# Patient Record
Sex: MALE | Race: WHITE | Employment: OTHER | URBAN - METROPOLITAN AREA
[De-identification: names, ages, dates, MRNs, and addresses within clinical notes are randomized per-mention and may not be internally consistent; named-entity substitution may affect disease eponyms.]

---

## 2021-01-13 ENCOUNTER — HOSPITAL ENCOUNTER (INPATIENT)
Age: 61
LOS: 8 days | Discharge: HOME | DRG: 246 | End: 2021-01-21
Attending: EMERGENCY MEDICINE | Admitting: INTERNAL MEDICINE
Payer: COMMERCIAL

## 2021-01-13 DIAGNOSIS — I21.3 ST ELEVATION MYOCARDIAL INFARCTION (STEMI), UNSPECIFIED ARTERY (HCC): Primary | ICD-10-CM

## 2021-01-13 PROBLEM — I21.02 STEMI INVOLVING LEFT ANTERIOR DESCENDING CORONARY ARTERY (HCC): Status: ACTIVE | Noted: 2021-01-13

## 2021-01-13 LAB
A/G RATIO: 1.3 (ref 1.1–2.2)
ABO/RH: NORMAL
ALBUMIN SERPL-MCNC: 3.9 G/DL (ref 3.4–5)
ALP BLD-CCNC: 69 U/L (ref 40–129)
ALT SERPL-CCNC: 61 U/L (ref 10–40)
ANION GAP SERPL CALCULATED.3IONS-SCNC: 10 MMOL/L (ref 3–16)
ANTIBODY SCREEN: NORMAL
APTT: 45.2 SEC (ref 24.2–36.2)
APTT: >248 SEC (ref 24.2–36.2)
AST SERPL-CCNC: 45 U/L (ref 15–37)
BASE EXCESS ARTERIAL: -4 MMOL/L (ref -3–3)
BASOPHILS ABSOLUTE: 0 K/UL (ref 0–0.2)
BASOPHILS RELATIVE PERCENT: 0.6 %
BILIRUB SERPL-MCNC: 0.5 MG/DL (ref 0–1)
BILIRUBIN URINE: NEGATIVE
BLOOD, URINE: NEGATIVE
BUN BLDV-MCNC: 24 MG/DL (ref 7–20)
CALCIUM SERPL-MCNC: 8.6 MG/DL (ref 8.3–10.6)
CARBOXYHEMOGLOBIN ARTERIAL: 0.9 % (ref 0–1.5)
CHLORIDE BLD-SCNC: 99 MMOL/L (ref 99–110)
CLARITY: CLEAR
CO2: 22 MMOL/L (ref 21–32)
COLOR: YELLOW
CREAT SERPL-MCNC: 0.7 MG/DL (ref 0.8–1.3)
EOSINOPHILS ABSOLUTE: 0.2 K/UL (ref 0–0.6)
EOSINOPHILS RELATIVE PERCENT: 2.8 %
GFR AFRICAN AMERICAN: >60
GFR NON-AFRICAN AMERICAN: >60
GLOBULIN: 3 G/DL
GLUCOSE BLD-MCNC: 123 MG/DL (ref 70–99)
GLUCOSE URINE: 250 MG/DL
HCO3 ARTERIAL: 20.9 MMOL/L (ref 21–29)
HCT VFR BLD CALC: 42.7 % (ref 40.5–52.5)
HEMOGLOBIN, ART, EXTENDED: 14.4 G/DL (ref 13.5–17.5)
HEMOGLOBIN: 14.1 G/DL (ref 13.5–17.5)
INR BLD: 1.11 (ref 0.86–1.14)
KETONES, URINE: NEGATIVE MG/DL
LEFT VENTRICULAR EJECTION FRACTION HIGH VALUE: 30 %
LEFT VENTRICULAR EJECTION FRACTION MODE: NORMAL
LEUKOCYTE ESTERASE, URINE: NEGATIVE
LV EF: 25 %
LYMPHOCYTES ABSOLUTE: 2.3 K/UL (ref 1–5.1)
LYMPHOCYTES RELATIVE PERCENT: 28.9 %
MCH RBC QN AUTO: 30.5 PG (ref 26–34)
MCHC RBC AUTO-ENTMCNC: 33 G/DL (ref 31–36)
MCV RBC AUTO: 92.3 FL (ref 80–100)
METHEMOGLOBIN ARTERIAL: 0.2 %
MICROSCOPIC EXAMINATION: ABNORMAL
MONOCYTES ABSOLUTE: 0.7 K/UL (ref 0–1.3)
MONOCYTES RELATIVE PERCENT: 9.1 %
NEUTROPHILS ABSOLUTE: 4.6 K/UL (ref 1.7–7.7)
NEUTROPHILS RELATIVE PERCENT: 58.6 %
NITRITE, URINE: NEGATIVE
O2 CONTENT ARTERIAL: 20 ML/DL
O2 SAT, ARTERIAL: 99.4 %
O2 THERAPY: ABNORMAL
PCO2 ARTERIAL: 36.9 MMHG (ref 35–45)
PDW BLD-RTO: 13.4 % (ref 12.4–15.4)
PH ARTERIAL: 7.36 (ref 7.35–7.45)
PH UA: 7 (ref 5–8)
PLATELET # BLD: 150 K/UL (ref 135–450)
PMV BLD AUTO: 9.2 FL (ref 5–10.5)
PO2 ARTERIAL: 138 MMHG (ref 75–108)
POC ACT LR: 225 SEC
POC ACT LR: 300 SEC
POC ACT LR: 319 SEC
POTASSIUM SERPL-SCNC: 3.3 MMOL/L (ref 3.5–5.1)
PROTEIN UA: NEGATIVE MG/DL
PROTHROMBIN TIME: 12.9 SEC (ref 10–13.2)
RBC # BLD: 4.63 M/UL (ref 4.2–5.9)
SARS-COV-2, NAAT: DETECTED
SODIUM BLD-SCNC: 131 MMOL/L (ref 136–145)
SPECIFIC GRAVITY UA: >1.03 (ref 1–1.03)
TCO2 ARTERIAL: 49.4 MMOL/L
TOTAL PROTEIN: 6.9 G/DL (ref 6.4–8.2)
URINE REFLEX TO CULTURE: ABNORMAL
URINE TYPE: ABNORMAL
UROBILINOGEN, URINE: 0.2 E.U./DL
WBC # BLD: 7.8 K/UL (ref 4–11)

## 2021-01-13 PROCEDURE — 80053 COMPREHEN METABOLIC PANEL: CPT

## 2021-01-13 PROCEDURE — 80061 LIPID PANEL: CPT

## 2021-01-13 PROCEDURE — 85610 PROTHROMBIN TIME: CPT

## 2021-01-13 PROCEDURE — 86901 BLOOD TYPING SEROLOGIC RH(D): CPT

## 2021-01-13 PROCEDURE — 85730 THROMBOPLASTIN TIME PARTIAL: CPT

## 2021-01-13 PROCEDURE — 99291 CRITICAL CARE FIRST HOUR: CPT | Performed by: INTERNAL MEDICINE

## 2021-01-13 PROCEDURE — 93458 L HRT ARTERY/VENTRICLE ANGIO: CPT | Performed by: INTERNAL MEDICINE

## 2021-01-13 PROCEDURE — C9460 INJECTION, CANGRELOR: HCPCS | Performed by: INTERNAL MEDICINE

## 2021-01-13 PROCEDURE — 99153 MOD SED SAME PHYS/QHP EA: CPT

## 2021-01-13 PROCEDURE — 6370000000 HC RX 637 (ALT 250 FOR IP): Performed by: INTERNAL MEDICINE

## 2021-01-13 PROCEDURE — 2580000003 HC RX 258: Performed by: INTERNAL MEDICINE

## 2021-01-13 PROCEDURE — 86900 BLOOD TYPING SEROLOGIC ABO: CPT

## 2021-01-13 PROCEDURE — 84443 ASSAY THYROID STIM HORMONE: CPT

## 2021-01-13 PROCEDURE — 6370000000 HC RX 637 (ALT 250 FOR IP)

## 2021-01-13 PROCEDURE — 2709999900 HC NON-CHARGEABLE SUPPLY

## 2021-01-13 PROCEDURE — 2500000003 HC RX 250 WO HCPCS: Performed by: INTERNAL MEDICINE

## 2021-01-13 PROCEDURE — 4A023N7 MEASUREMENT OF CARDIAC SAMPLING AND PRESSURE, LEFT HEART, PERCUTANEOUS APPROACH: ICD-10-PCS | Performed by: INTERNAL MEDICINE

## 2021-01-13 PROCEDURE — 99152 MOD SED SAME PHYS/QHP 5/>YRS: CPT | Performed by: INTERNAL MEDICINE

## 2021-01-13 PROCEDURE — 82803 BLOOD GASES ANY COMBINATION: CPT

## 2021-01-13 PROCEDURE — B2151ZZ FLUOROSCOPY OF LEFT HEART USING LOW OSMOLAR CONTRAST: ICD-10-PCS | Performed by: INTERNAL MEDICINE

## 2021-01-13 PROCEDURE — 93458 L HRT ARTERY/VENTRICLE ANGIO: CPT

## 2021-01-13 PROCEDURE — 85347 COAGULATION TIME ACTIVATED: CPT

## 2021-01-13 PROCEDURE — 05HN33Z INSERTION OF INFUSION DEVICE INTO LEFT INTERNAL JUGULAR VEIN, PERCUTANEOUS APPROACH: ICD-10-PCS | Performed by: INTERNAL MEDICINE

## 2021-01-13 PROCEDURE — U0002 COVID-19 LAB TEST NON-CDC: HCPCS

## 2021-01-13 PROCEDURE — 2500000003 HC RX 250 WO HCPCS

## 2021-01-13 PROCEDURE — C1894 INTRO/SHEATH, NON-LASER: HCPCS

## 2021-01-13 PROCEDURE — 36415 COLL VENOUS BLD VENIPUNCTURE: CPT

## 2021-01-13 PROCEDURE — 2580000003 HC RX 258

## 2021-01-13 PROCEDURE — 92941 PRQ TRLML REVSC TOT OCCL AMI: CPT | Performed by: INTERNAL MEDICINE

## 2021-01-13 PROCEDURE — C1887 CATHETER, GUIDING: HCPCS

## 2021-01-13 PROCEDURE — 92941 PRQ TRLML REVSC TOT OCCL AMI: CPT

## 2021-01-13 PROCEDURE — C1769 GUIDE WIRE: HCPCS

## 2021-01-13 PROCEDURE — C9460 INJECTION, CANGRELOR: HCPCS

## 2021-01-13 PROCEDURE — 99152 MOD SED SAME PHYS/QHP 5/>YRS: CPT

## 2021-01-13 PROCEDURE — 6360000004 HC RX CONTRAST MEDICATION: Performed by: INTERNAL MEDICINE

## 2021-01-13 PROCEDURE — 85025 COMPLETE CBC W/AUTO DIFF WBC: CPT

## 2021-01-13 PROCEDURE — U0003 INFECTIOUS AGENT DETECTION BY NUCLEIC ACID (DNA OR RNA); SEVERE ACUTE RESPIRATORY SYNDROME CORONAVIRUS 2 (SARS-COV-2) (CORONAVIRUS DISEASE [COVID-19]), AMPLIFIED PROBE TECHNIQUE, MAKING USE OF HIGH THROUGHPUT TECHNOLOGIES AS DESCRIBED BY CMS-2020-01-R: HCPCS

## 2021-01-13 PROCEDURE — 02713ZZ DILATION OF CORONARY ARTERY, TWO ARTERIES, PERCUTANEOUS APPROACH: ICD-10-PCS | Performed by: INTERNAL MEDICINE

## 2021-01-13 PROCEDURE — 6360000002 HC RX W HCPCS: Performed by: INTERNAL MEDICINE

## 2021-01-13 PROCEDURE — 2000000000 HC ICU R&B

## 2021-01-13 PROCEDURE — C1725 CATH, TRANSLUMIN NON-LASER: HCPCS

## 2021-01-13 PROCEDURE — 99282 EMERGENCY DEPT VISIT SF MDM: CPT

## 2021-01-13 PROCEDURE — B2111ZZ FLUOROSCOPY OF MULTIPLE CORONARY ARTERIES USING LOW OSMOLAR CONTRAST: ICD-10-PCS | Performed by: INTERNAL MEDICINE

## 2021-01-13 PROCEDURE — 86850 RBC ANTIBODY SCREEN: CPT

## 2021-01-13 PROCEDURE — 81003 URINALYSIS AUTO W/O SCOPE: CPT

## 2021-01-13 PROCEDURE — 83036 HEMOGLOBIN GLYCOSYLATED A1C: CPT

## 2021-01-13 PROCEDURE — 6360000002 HC RX W HCPCS

## 2021-01-13 RX ORDER — SODIUM CHLORIDE 0.9 % (FLUSH) 0.9 %
10 SYRINGE (ML) INJECTION EVERY 12 HOURS SCHEDULED
Status: DISCONTINUED | OUTPATIENT
Start: 2021-01-13 | End: 2021-01-21 | Stop reason: HOSPADM

## 2021-01-13 RX ORDER — ASPIRIN 325 MG
325 TABLET ORAL DAILY
Status: DISCONTINUED | OUTPATIENT
Start: 2021-01-14 | End: 2021-01-18

## 2021-01-13 RX ORDER — POTASSIUM CHLORIDE 20 MEQ/1
40 TABLET, EXTENDED RELEASE ORAL PRN
Status: DISCONTINUED | OUTPATIENT
Start: 2021-01-13 | End: 2021-01-21 | Stop reason: HOSPADM

## 2021-01-13 RX ORDER — HEPARIN SODIUM 1000 [USP'U]/ML
4000 INJECTION, SOLUTION INTRAVENOUS; SUBCUTANEOUS PRN
Status: DISCONTINUED | OUTPATIENT
Start: 2021-01-13 | End: 2021-01-20

## 2021-01-13 RX ORDER — HEPARIN SODIUM 10000 [USP'U]/100ML
12 INJECTION, SOLUTION INTRAVENOUS CONTINUOUS
Status: DISCONTINUED | OUTPATIENT
Start: 2021-01-13 | End: 2021-01-20

## 2021-01-13 RX ORDER — HEPARIN SODIUM 1000 [USP'U]/ML
2000 INJECTION, SOLUTION INTRAVENOUS; SUBCUTANEOUS PRN
Status: DISCONTINUED | OUTPATIENT
Start: 2021-01-13 | End: 2021-01-20

## 2021-01-13 RX ORDER — POTASSIUM CHLORIDE 7.45 MG/ML
10 INJECTION INTRAVENOUS PRN
Status: DISCONTINUED | OUTPATIENT
Start: 2021-01-13 | End: 2021-01-21 | Stop reason: HOSPADM

## 2021-01-13 RX ORDER — SODIUM CHLORIDE 0.9 % (FLUSH) 0.9 %
10 SYRINGE (ML) INJECTION PRN
Status: DISCONTINUED | OUTPATIENT
Start: 2021-01-13 | End: 2021-01-21 | Stop reason: HOSPADM

## 2021-01-13 RX ORDER — ATORVASTATIN CALCIUM 80 MG/1
80 TABLET, FILM COATED ORAL NIGHTLY
Status: DISCONTINUED | OUTPATIENT
Start: 2021-01-13 | End: 2021-01-14

## 2021-01-13 RX ORDER — NITROGLYCERIN 20 MG/100ML
5 INJECTION INTRAVENOUS CONTINUOUS
Status: DISCONTINUED | OUTPATIENT
Start: 2021-01-13 | End: 2021-01-21 | Stop reason: HOSPADM

## 2021-01-13 RX ORDER — MORPHINE SULFATE 2 MG/ML
2 INJECTION, SOLUTION INTRAMUSCULAR; INTRAVENOUS
Status: DISCONTINUED | OUTPATIENT
Start: 2021-01-13 | End: 2021-01-21 | Stop reason: HOSPADM

## 2021-01-13 RX ORDER — MORPHINE SULFATE 4 MG/ML
4 INJECTION, SOLUTION INTRAMUSCULAR; INTRAVENOUS
Status: DISCONTINUED | OUTPATIENT
Start: 2021-01-13 | End: 2021-01-21 | Stop reason: HOSPADM

## 2021-01-13 RX ORDER — ACETAMINOPHEN 325 MG/1
650 TABLET ORAL EVERY 4 HOURS PRN
Status: DISCONTINUED | OUTPATIENT
Start: 2021-01-13 | End: 2021-01-21 | Stop reason: HOSPADM

## 2021-01-13 RX ADMIN — NITROGLYCERIN 50 MCG/MIN: 20 INJECTION INTRAVENOUS at 16:41

## 2021-01-13 RX ADMIN — IOPAMIDOL 121 ML: 755 INJECTION, SOLUTION INTRAVENOUS at 15:44

## 2021-01-13 RX ADMIN — CANGRELOR 0.75 MCG/KG/MIN: 50 INJECTION, POWDER, LYOPHILIZED, FOR SOLUTION INTRAVENOUS at 17:42

## 2021-01-13 RX ADMIN — CARBIDOPA AND LEVODOPA 1 TABLET: 25; 100 TABLET ORAL at 23:27

## 2021-01-13 RX ADMIN — METOPROLOL TARTRATE 12.5 MG: 25 TABLET, FILM COATED ORAL at 20:49

## 2021-01-13 ASSESSMENT — PAIN SCALES - GENERAL: PAINLEVEL_OUTOF10: 0

## 2021-01-13 NOTE — ED PROVIDER NOTES
There is no distension. Palpations: Abdomen is soft. Musculoskeletal: Normal range of motion. General: No swelling, tenderness, deformity or signs of injury. Right lower leg: No edema. Left lower leg: No edema. Skin:     General: Skin is warm and dry. Capillary Refill: Capillary refill takes less than 2 seconds. Coloration: Skin is pale. Skin is not jaundiced. Findings: No bruising, erythema, lesion or rash. Neurological:      General: No focal deficit present. Mental Status: He is alert and oriented to person, place, and time. Cranial Nerves: No cranial nerve deficit. Sensory: No sensory deficit. Motor: No weakness or abnormal muscle tone. Coordination: Coordination normal.   Psychiatric:         Mood and Affect: Mood normal.         Behavior: Behavior normal.         Thought Content: Thought content normal.         Judgment: Judgment normal.         DIAGNOSTIC RESULTS     EKG (Per Emergency Physician):   EKG interpretation by ED physician: Normal axis, sinus rhythm at 62 bpm.  ST elevation MI anteriorly with reciprocal changes inferiorly. RADIOLOGY (Per Emergency Physician): Interpretation per the Radiologist below, if available at the time of this note:  No results found. LABS:  Labs Reviewed - No data to display    All other labs were within normal range or not returned as of this dictation. EMERGENCY DEPARTMENT COURSE and DIFFERENTIAL DIAGNOSIS/MDM:   Vitals: There were no vitals filed for this visit. Medications - No data to display    MDM. Code ST elevation MI was called prior to patient arrival after review of EKG sent by EMS. .  Discussed case with Dr. Tashia Craft who evaluated pt immediately upon their arrival. Yoanna Razo taken PTA. Would like pt taken directly to cath lab.      CRITICAL CARE TIME: 5 minutes excluding billable procedure time: Initiation of code STEMI, evaluation of initial EKG, discussion with EMS, discussion with cardiology, complex MDM, potential for deterioration. CONSULTS:  IP CONSULT TO CARDIOLOGY    PROCEDURES:  Unless otherwise noted below, none     Procedures    FINAL IMPRESSION      1. ST elevation myocardial infarction (STEMI), unspecified artery ([facility] [address]          DISPOSITION/PLAN   DISPOSITION Decision To Admit 01/13/2021 01:51:34 PM      PATIENT REFERRED TO:  No follow-up provider specified. DISCHARGE MEDICATIONS:  New Prescriptions    No medications on file          (Please note:  Portions of this note were completed with a voice recognition program. Efforts were made to edit the dictations but occasionally words and phrases are mis-transcribed.)    Form v2016. J.5-cn    Alexander Auguste DO (electronically signed)  Emergency Medicine Provider              Carlos Sarah DO  01/13/21 2131

## 2021-01-13 NOTE — PROGRESS NOTES
Panic value received from lab: PTT >248. Primary AARON Gomez notified of value.     Monse Lebron   1/13/2021

## 2021-01-13 NOTE — CONSULTS
Cardiothoracic Surgery Consultation           PCP: Alphonse Durham MD    Referring Physician: Dr. Juan Ramon Swift     DIAGNOSIS:  Multivessel coronary artery disease, STEMI    CHIEF COMPLAINT:  Back pain     History Obtained From:  electronic medical record, patient, wife    HISTORY OF PRESENT ILLNESS:      The patient is a 61 y.o. male with significant past medical history of CAD (stents mid LAD 2/5/2015 [facility]), HTN, HLD, hx elevated liver enzymes, and tonsillectomy (difficult intubation 2015) who started having severe back and chest pain while working outside on his deck and was brought to [facility] via squad. A code stemi was called prior to arrival and ER was bypassed. Dr. Eren Rg performed an angiogram and the patient was found to have multivessel coronary artery disease. CVTS was consulted for evaluation for myocardial revascularization. Patient is currently denying any complaints of chest pain or shortness of breath. He was given a dose of Brilinta today (1/13). He is currently on Cangelor, heparin and NTG gtts. Patient had a rapid covid test upon admission to CVU and it came back as positive. Past Medical History:    No past medical history on file. Past Surgical History:        Procedure Laterality Date    ELBOW SURGERY      rt elbow       Medications:   Home Meds:   Prior to Admission medications    Medication Sig Start Date End Date Taking?  Authorizing Provider   methocarbamol (ROBAXIN) 500 MG tablet  11/21/14   Historical Provider, MD   rOPINIRole (REQUIP) 0.5 MG tablet  12/15/14   Historical Provider, MD      Scheduled Meds:   Continuous Infusions:     Current Facility-Administered Medications   Medication Dose Route Frequency Provider Last Rate Last Admin    sodium chloride flush 0.9 % injection 10 mL  10 mL Intravenous 2 times per day Rusty Morgan DO        sodium chloride flush 0.9 % injection 10 mL  10 mL Intravenous PRN Rusty Morgan DO        acetaminophen (TYLENOL) tablet 650 mg  650 mg Oral Q4H PRN Carolynn Blanca, DO        metoprolol tartrate (LOPRESSOR) tablet 12.5 mg  12.5 mg Oral BID Carolynn Blanca, DO        nitroGLYCERIN 50 mg in dextrose 5% 250 mL infusion  5 mcg/min Intravenous Continuous Carolynn Blanca, DO   Stopped at 01/13/21 1740    atorvastatin (LIPITOR) tablet 80 mg  80 mg Oral Nightly Carolynn Blanca, DO        [START ON 1/14/2021] aspirin tablet 325 mg  325 mg Oral Daily Carolynn Blanca, DO        heparin (porcine) injection 4,000 Units  4,000 Units Intravenous PRN Carolynn Blanca, DO        heparin (porcine) injection 2,000 Units  2,000 Units Intravenous PRN Carolynn Blanca, DO        heparin 25,000 units in dextrose 5% 250 mL infusion  12 Units/kg/hr Intravenous Continuous Carolynn Blanca, DO 6 mL/hr at 01/13/21 1638 6 Units/kg/hr at 01/13/21 1638    morphine (PF) injection 2 mg  2 mg Intravenous Q2H PRN Carolynn Blanca, DO        Or    morphine injection 4 mg  4 mg Intravenous Q2H PRN Carolynn Blanca, DO        [facility]) 50 mg in sodium chloride 0.9 % 250 mL infusion  0.75 mcg/kg/min Intravenous Continuous Carolynn Blanca, DO 22.4 mL/hr at 01/13/21 1742 0.75 mcg/kg/min at 01/13/21 1742    potassium chloride (KLOR-CON M) extended release tablet 40 mEq  40 mEq Oral PRN Carolynn Blanca, DO        Or    potassium bicarb-citric acid (EFFER-K) effervescent tablet 40 mEq  40 mEq Oral PRN Carolynn Blanca, DO        Or    potassium chloride 10 mEq/100 mL IVPB (Peripheral Line)  10 mEq Intravenous PRN Carolynn Blanca, DO           Allergies:  Patient has no known allergies. Social History:    TOBACCO:  Patient smoked 1 ppd from age 13 to age 54. He quit in 2015.    ETOH:  Drinks at least a six pack - 3-4 nights a week  DRUGS:  none  LIFESTYLE: sedentary  MARITAL STATUS:   to 3rd wife  OCCUPATION:  Retired for 10 years    Family History:    Brother and mother with heart disease, brother with kidney transplant      REVIEW OF SYSTEMS:    CONSTITUTIONAL:  fatigue  DERMATOLOGICAL: negative  NEUROLOGICAL:  negative  EYES:  positive for  glasses  HEENT:  positive for snoring, diagnosed with sleep apnea 10 years ago but doesn't wear cpap  RESPIRATORY:  negative  CARDIOVASCULAR:  positive for  chest pain, swelling lower extremities at times  GASTROINTESTINAL:  none  GENITO-URINARY: no dysuria, trouble voiding, or hematuria  ENDOCRINE: negative  MUSCULOSKELETAL:  negative  HEMATOLOGICAL AND LYMPHATIC: negative  IMMUNOLOGICAL: negative  PSYCHOLOGICAL: negative    PHYSICAL EXAM:    VITALS:  BP (!) 140/84   Pulse 84   Temp 97.5 °F (36.4 °C) (Temporal)   Resp 17   Ht 5' 5.98\" (1.676 m)   Wt 219 lb 12.8 oz (99.7 kg) Comment: From 7/2020 pcp visit  SpO2 99%   BMI 35.49 kg/m²   Hand Dominance: right    Eyes:  lids and lashes normal, pupils equal and round, extra ocular muscles intact, sclera clear, conjunctiva normal    Head/ENT:  Normocephalic, atraumatic, good residual dentition, normal gums, & palate, oropharynx without erythema or exudates    Neck:  supple, symmetrical, trachea midline, no lymphadenopathy, no jugular venous distension, no carotid bruits and MASSES:  no masses    Lungs:  no increased work of breathing, good air exchange, no retractions and clear to auscultation, no palpable / percussible abnormalities    Cardiovascular:  regular rate and rhythm, S1, S2 normal, no murmur, click, rub or gallop    Pulses:     carotid brachial radial femoral popliteal DP PT   RIGHT 2+ 2+ TR band 2+ 2+ 1+ 1+   LEFT 2+ 2+ 2+ 2+ 2+ 2+ 2+     Abdomen:  Soft, normal bowel sounds, non-tender, no hepatosplenomegaly, aorta likely normal and bruits absent    Musculoskeletal:  Back is straight and non-tender,  No CVAT, full ROM of upper and lower extremities. Extremities:   Well healed right leg and right elbow scars.  No clubbing, or cyanosis, or edema     Skin: warm and normal turgor, no ulcers, infections, or rashes, no rashes, no ecchymoses, no petechiae, no nodules, no jaundice    Neurological: awake, alert and oriented x 3, motor 5/5 bilateral upper and lower extremities, sensation grossly intact    Psychiatric: Mood and affect appear appropriate    LABS:  BMP:   Lab Results   Component Value Date     01/13/2021    K 3.3 01/13/2021    CL 99 01/13/2021    CO2 22 01/13/2021    BUN 24 01/13/2021    CREATININE 0.7 01/13/2021      No components found for: GLUNo results found for: MG   CBC:   Lab Results   Component Value Date    WBC 7.8 01/13/2021    HGB 14.1 01/13/2021    HCT 42.7 01/13/2021    MCV 92.3 01/13/2021     01/13/2021      Coagulation:   Lab Results   Component Value Date    INR 1.11 01/13/2021    APTT >248.0 01/13/2021     Lab Results   Component Value Date    BILITOT 0.5 01/13/2021    AST 45 01/13/2021    ALT 61 01/13/2021    ALKPHOS 69 01/13/2021      TESTING/IMAGING    ANGIOGRAM: 1/12/2021  Dominance: Right       LM: 40% eccentric distal stenosis   LAD: 90% ostial to proximal stenosis; 90% mid in stent restenosis , jailed second diagonal with 30% ostial narrowing ; 100% mid stent thrombosis at distal edge   LCx: diffuse disease with 90% mid and distal stenoses, with severe disease extending into proximal OM2   RCA: 70% proximal and 85% mid stenoses; RPLB and RPDA free of significant disease      LVEDP: 17 mmHg   LVEF: 25-30%      [id number] Score for Atrial Fibrillation Stroke Risk   Risk   Factors  Component Value   C CHF No 0   H HTN No 1   A2 Age >= 75 No,  (57 y.o.) 0   D DM No 0   S2 Prior Stroke/TIA No 0   V Vascular Disease Yes 1   A Age 74-69 No,  (57 y.o.) 0   Sc Sex male 0    [id number]  Score  2   Score last updated 1/13/21 9:52 PM EST    Click here for a link to the UpToDate guideline \"Atrial Fibrillation: Anticoagulation therapy to prevent embolization    Disclaimer: Risk Score calculation is dependent on accuracy of patient problem list and past encounter diagnosis. CTS Adult Cardiac Risk: CABG: pending carotids, echo, and pfts  Mortality Risk: 1.591%  Renal Failure: 1.582%  Permanent Stroke: 0.784%  Prolonged Ventilation: 11.588%  DSW Infection: 0.321%  Reoperation: 3.126%  Morbidity and Mortality: 14.734%  Short Length of Stay: 49.186%  Long Length of Stay: 5.001%      ASSESSMENT AND PLAN:    3V CAD, STEMI aborted with PCI, COVID +, h/o stents, HTN, HLD    Best option for revascularization would be CABG but COVID complicates course. Agree with initial medical management for 1 week for myocardial recovery regardless of next steps. Will continue to discuss with interventional cardiology and ID potential temporizing steps to stabilize CAD long enough for patient to recovery from [facility] [place] [facility]\"" in anticipation of future CABG as his risk of morbidity and mortality now with CABG is higher than STS score. UptoDate quotes ~26% mortality for CABG in symptomatic COVID patients. Thank you Dr. Rosette Mays for the consultation.        Electronically signed by LYDIA Farmer CNP on 1/13/2021 at 8:03 PM  Phill Small MD

## 2021-01-13 NOTE — H&P
Cardiovascular History & Physical     PATIENT: Camille Melo  : 1960  MRN: [id number]    Chief complaint:   CP    History of present illness:   Mr. Camille Melo is a 61 y.o. male patient with a past medical history of coronary artery disease and LAD stenting at outside hospital in . He had 2 drug-eluting stents to his mid LAD at that time and reports a stable angiogram with patent stents and no new plaque disease later that year. Rafi denies recurrent angina until 30 minutes prior to emergency room arrival today. EMS field EKG revealed anterior ST elevations and out of hospital alert was called. Zane states that he was working on cleaning his deck to sell their house and became acutely diaphoretic as well. He states that his angina in 2015 was milder and that this is the most intense he's ever felt. Denies fever chills cough sick contacts or previous recent infection. Denies palpitations lightheadedness vomiting. Medical History:  No past medical history on file.     Surgical History:      Procedure Laterality Date    ELBOW SURGERY      rt elbow       Social History:  Social History     Socioeconomic History    Marital status:      Spouse name: Not on file    Number of children: Not on file    Years of education: Not on file    Highest education level: Not on file   Occupational History    Not on file   Social Needs    Financial resource strain: Not on file    Food insecurity     Worry: Not on file     Inability: Not on file    Transportation needs     Medical: Not on file     Non-medical: Not on file   Tobacco Use    Smoking status: Former Smoker   Substance and Sexual Activity    Alcohol use: Not on file    Drug use: Not on file    Sexual activity: Not on file   Lifestyle    Physical activity     Days per week: Not on file     Minutes per session: Not on file    Stress: Not on file   Relationships    Social connections     Talks on phone: Not on file     Gets together: Not on file     Attends Jewish service: Not on file     Active member of club or organization: Not on file     Attends meetings of clubs or organizations: Not on file     Relationship status: Not on file    Intimate partner violence     Fear of current or ex partner: Not on file     Emotionally abused: Not on file     Physically abused: Not on file     Forced sexual activity: Not on file   Other Topics Concern    Not on file   Social History Narrative    Not on file        Family History:  No evidence for sudden cardiac death or premature CAD. No family history on file. Medications:  Prior to Admission medications    Medication Sig Start Date End Date Taking? Authorizing Provider   methocarbamol (ROBAXIN) 500 MG tablet  11/21/14   Historical Provider, MD   rOPINIRole (REQUIP) 0.5 MG tablet  12/15/14   Historical Provider, MD       Allergies:  Patient has no known allergies.      Review of Systems:   [x]Full ROS obtained and negative except as mentioned in HPI    Physical Examination:    BP (!) 140/84   Pulse 84   Temp 97.5 °F (36.4 °C) (Temporal)   Resp 17   Ht 5' 5.98\" (1.676 m)   Wt 219 lb 12.8 oz (99.7 kg) Comment: From 7/2020 pcp visit  SpO2 99%   BMI 35.49 kg/m²   Wt Readings from Last 3 Encounters:   01/13/21 219 lb 12.8 oz (99.7 kg)   01/14/15 190 lb (86.2 kg)       GENERAL: Well developed, well nourished, no acute distress  NEUROLOGICAL: Alert and oriented x3  PSYCH: Normal mood and affect   SKIN: Warm and dry, without lesions  HEENT: Normocephalic, atraumatic, Sclera non-icteric, mucous membranes moist  NECK: supple, JVP normal, thyroid not enlarged   CAROTID: Normal upstroke, no bruits  CARDIAC: Normal PMI, regular rate and rhythm, normal S1S2, no murmur, rub  RESPIRATORY: Normal respiratory effort, clear to auscultation bilaterally  EXTREMITIES: No cyanosis, clubbing or edema, palpable pulses bilaterally   MUSCULOSKELETAL: No joint swelling or tenderness, no chest wall tenderness  GASTROINTESTINAL:  soft, non-tender, no bruit    Labs:  Lab Review   Lab Results   Component Value Date     2021    K 3.3 2021    CL 99 2021    CO2 22 2021    BUN 24 2021    CREATININE 0.7 2021    GLUCOSE 123 2021    CALCIUM 8.6 2021       Lab Results   Component Value Date    WBC 7.8 2021    HGB 14.1 2021    HCT 42.7 2021    MCV 92.3 2021     2021       Imaging:  I have reviewed the below testing personally:    EK/13/21  SR  1-2 mm anteroseptal ST elevations    [facility] 2015  Summary Comments:    CORONARY ANGIOGRAPHY FINDINGS    DOMINANCE:  Right dominant        LEFT MAIN: Normal                       LEFT ANTERIOR DESCENDING:  Stenotic and Luminal   irregularities 90% mid lesion at bifurcation, acute lesion,   eccentric, irregular contour, Type C lesion,  and diffuse   length < 20 mm 60% ostial first and second small   diagonal              LEFT CIRCUMFLEX: Luminal irregularities and   Stenotic    50% distal                  RIGHT CORONARY:  Stenotic and Dominant    50%   distal          LEFT VENTRICULAR FUNCTION:        LVEF: 45%        LVEDP: Normal pre-angio        LV Systolic Pressure: Normal        LV to AO Gradient: none         LV Wall Motion:  Abnormal, anterior mild   hypokinesis    .         MITRAL VALVE: No mitral insufficiency  Successful PTCA and subsequent deployment of serial 2.75 x   12 and 2.25 x 24 mm Promus premier drug-eluting stents to a   long diffusely severely diseased segment of the mid LAD with   maximal area of narrowing of 90% was subsequently less than   10% residual   Plan:  Dual antiplatelet therapy is recommended for one year     CATH:  2015  Findings/Summary:   CORONARY ANGIOGRAPHY FINDINGS    DOMINANCE:  Right dominant        LEFT MAIN: Normal                       LEFT ANTERIOR DESCENDING:  Luminal irregularities widely patent   midportion stented segment and also widely patent diagonal which is   covered by the stent                   LEFT CIRCUMFLEX: Luminal irregularities and Stenotic    50%   distal                  RIGHT CORONARY:  Luminal irregularities and Stenotic    60% ostial   first RV marginal          LEFT VENTRICULAR FUNCTION:        LVEF: 50%        LVEDP: Normal pre-angio        LV Systolic Pressure: Normal         LV to AO Gradient: none         LV Wall Motion:  Abnormal, anterolateral mild hypokinesis    .        MITRAL VALVE: No mitral insufficiency          11/2016 SPECT  The LV EF is 41%  There is mild global hypokinesis of the left ventricle. 1.Non-diagnostic ECG for ischemia with pharmocologic stress. 2.No significant areas of ischemia identified with pharmocologic      stress. 3.Nuclear stress image findings indicate low risk for future      ischemic event . Impression/Recommendations    Mr. Otis Graham is a 61 y.o. male patient, last seen by [facility] in 11/2016:    Anterior STEMI  CAD: DESx2 mid LAD 2015  Ischemic cardiomyopathy   Obesity  Hypertension   Hyperlipidemia  CHELSEY    Risks, benefits, goals, and alternatives of left heart catheterization with the potential for percutaneous coronary intervention discussed with patient; including stroke, heart attack, kidney damage, death, paralysis, disability, damage to nerves/arteries/veins. All questions answered and informed consent obtained. Further recommendations pending coronary angiography and clinical course. Critical care time spent in direct management of this patient was 50 minutes, exclusive of separately documented procedures. Time spent includes but was not limited to directly managing the unstable patient, reviewing diagnostics, speaking with medical staff, and developing a treatment plan.     Cristian Christianson D.O., [facility]  Interventional Cardiology     o: [phone]  [address] [place] [address]., [address] [facility], [address] [place] [address]

## 2021-01-13 NOTE — ED NOTES
Per Dr. Ramya Mancini called code stemi. Pt in route w/ [facility] @ [phone]. Dr. Kalyn Felder, int cards, in the ER after code stemi announced.       [employer]  01/13/21 6195

## 2021-01-13 NOTE — PRE SEDATION
Brief Pre-Op Note/Sedation Assessment      Anny Conn  1960  Cath/NONE      [id number]  3:14 PM    Planned Procedure: Cardiac Catheterization Procedure    Post Procedure Plan: Return to same level of care    Consent: I have discussed with the patient and/or the patient representative the indication, alternatives, and the possible risks and/or complications of the planned procedure and the anesthesia methods. The patient and/or patient representative appear to understand and agree to proceed. Chief Complaint: STEMI      Indications for Cath Procedure:  ACS <= 24 hrs  Anginal Classification within 2 weeks:  CCS IV - Inability to perform any activity without angina or angina at rest, i.e., severe limitation  NYHA Heart Failure Class within 2 weeks: No symptoms  Is Cath Lab Visit Valve-related?: No  Surgical Risk: Intermediate  Functional Type: >= 4 METS with symptoms    Anti- Anginal Meds within 2 weeks:   Yes: Aspirin    Stress or Imaging Studies Performed:  None     Vital Signs: There were no vitals taken for this visit. Allergies:  No Known Allergies    Past Medical History:  No past medical history on file. Surgical History:  Past Surgical History:   Procedure Laterality Date    ELBOW SURGERY      rt elbow         Medications:  No current facility-administered medications for this encounter. Pre-Sedation:    Pre-Sedation Documentation and Exam:  I have personally completed a history, physical exam & review of systems for this patient (see notes). Prior History of Anesthesia Complications:   none    Modified Mallampati:  II (soft palate, uvula, fauces visible)    ASA Classification:  E Status - the procedure is performed on an Emergency basis      Gabino Scale:   Activity:  2 - Able to move 4 extremities voluntarily on command  Respiration:  2 - Able to breathe deeply and cough freely  Circulation:  2 - BP+/- 20mmHg of normal  Consciousness:  2 - Fully awake  Oxygen Saturation (color):  2 - Able to maintain oxygen saturation >92% on room air    Sedation/Anesthesia Plan:  Guard the patient's safety and welfare. Minimize physical discomfort and pain. Minimize negative psychological responses to treatment by providing sedation and analgesia and maximize the potential amnesia. Patient to meet pre-procedure discharge plan. Medication Planned:  midazolam intravenously and fentanyl intravenously    Patient is an appropriate candidate for plan of sedation: yes    The risks, benefits, goals, and alternatives of the procedure were discussed in detail with the patient. Informed consent was obtained and further recommendations will be made following the procedure. Glenn Soriano DO, [facility]  Interventional Cardiology     o: [phone]  [address], [address] [facility], [address] [facility]      NOTE:  This report was transcribed using voice recognition software. Every effort was made to ensure accuracy; however, inadvertent computerized transcription errors may be present.

## 2021-01-14 ENCOUNTER — APPOINTMENT (OUTPATIENT)
Dept: CT IMAGING | Age: 61
DRG: 246 | End: 2021-01-14
Payer: COMMERCIAL

## 2021-01-14 LAB
ANION GAP SERPL CALCULATED.3IONS-SCNC: 11 MMOL/L (ref 3–16)
APTT: 40.5 SEC (ref 24.2–36.2)
APTT: 56.7 SEC (ref 24.2–36.2)
APTT: 66.5 SEC (ref 24.2–36.2)
BUN BLDV-MCNC: 20 MG/DL (ref 7–20)
CALCIUM SERPL-MCNC: 8.9 MG/DL (ref 8.3–10.6)
CHLORIDE BLD-SCNC: 105 MMOL/L (ref 99–110)
CHOLESTEROL, TOTAL: 145 MG/DL (ref 0–199)
CO2: 22 MMOL/L (ref 21–32)
CREAT SERPL-MCNC: 0.7 MG/DL (ref 0.8–1.3)
ESTIMATED AVERAGE GLUCOSE: 114 MG/DL
GFR AFRICAN AMERICAN: >60
GFR NON-AFRICAN AMERICAN: >60
GLUCOSE BLD-MCNC: 105 MG/DL (ref 70–99)
HBA1C MFR BLD: 5.6 %
HCT VFR BLD CALC: 41.8 % (ref 40.5–52.5)
HDLC SERPL-MCNC: 41 MG/DL (ref 40–60)
HEMOGLOBIN: 13.9 G/DL (ref 13.5–17.5)
LDL CHOLESTEROL CALCULATED: 69 MG/DL
LV EF: 43 %
LVEF MODALITY: NORMAL
MCH RBC QN AUTO: 30.1 PG (ref 26–34)
MCHC RBC AUTO-ENTMCNC: 33.3 G/DL (ref 31–36)
MCV RBC AUTO: 90.5 FL (ref 80–100)
PDW BLD-RTO: 13.1 % (ref 12.4–15.4)
PLATELET # BLD: 157 K/UL (ref 135–450)
PMV BLD AUTO: 9.3 FL (ref 5–10.5)
POTASSIUM SERPL-SCNC: 4 MMOL/L (ref 3.5–5.1)
RBC # BLD: 4.61 M/UL (ref 4.2–5.9)
SARS-COV-2, PCR: DETECTED
SODIUM BLD-SCNC: 138 MMOL/L (ref 136–145)
TRIGL SERPL-MCNC: 173 MG/DL (ref 0–150)
TSH REFLEX: 1.71 UIU/ML (ref 0.27–4.2)
VLDLC SERPL CALC-MCNC: 35 MG/DL
WBC # BLD: 10 K/UL (ref 4–11)

## 2021-01-14 PROCEDURE — 71250 CT THORAX DX C-: CPT

## 2021-01-14 PROCEDURE — 99254 IP/OBS CNSLTJ NEW/EST MOD 60: CPT | Performed by: INTERNAL MEDICINE

## 2021-01-14 PROCEDURE — 85027 COMPLETE CBC AUTOMATED: CPT

## 2021-01-14 PROCEDURE — 93306 TTE W/DOPPLER COMPLETE: CPT

## 2021-01-14 PROCEDURE — 80048 BASIC METABOLIC PNL TOTAL CA: CPT

## 2021-01-14 PROCEDURE — 2580000003 HC RX 258: Performed by: INTERNAL MEDICINE

## 2021-01-14 PROCEDURE — 2500000003 HC RX 250 WO HCPCS: Performed by: INTERNAL MEDICINE

## 2021-01-14 PROCEDURE — 85730 THROMBOPLASTIN TIME PARTIAL: CPT

## 2021-01-14 PROCEDURE — APPNB30 APP NON BILLABLE TIME 0-30 MINS: Performed by: NURSE PRACTITIONER

## 2021-01-14 PROCEDURE — 6370000000 HC RX 637 (ALT 250 FOR IP): Performed by: INTERNAL MEDICINE

## 2021-01-14 PROCEDURE — 2000000000 HC ICU R&B

## 2021-01-14 PROCEDURE — APPSS15 APP SPLIT SHARED TIME 0-15 MINUTES: Performed by: NURSE PRACTITIONER

## 2021-01-14 PROCEDURE — C9460 INJECTION, CANGRELOR: HCPCS | Performed by: INTERNAL MEDICINE

## 2021-01-14 PROCEDURE — 99233 SBSQ HOSP IP/OBS HIGH 50: CPT | Performed by: INTERNAL MEDICINE

## 2021-01-14 PROCEDURE — 6360000002 HC RX W HCPCS: Performed by: INTERNAL MEDICINE

## 2021-01-14 RX ORDER — ATORVASTATIN CALCIUM 80 MG/1
80 TABLET, FILM COATED ORAL DAILY
Status: DISCONTINUED | OUTPATIENT
Start: 2021-01-14 | End: 2021-01-21 | Stop reason: HOSPADM

## 2021-01-14 RX ORDER — LISINOPRIL 5 MG/1
2.5 TABLET ORAL DAILY
Status: DISCONTINUED | OUTPATIENT
Start: 2021-01-14 | End: 2021-01-15

## 2021-01-14 RX ADMIN — CARBIDOPA AND LEVODOPA 1 TABLET: 25; 100 TABLET ORAL at 21:30

## 2021-01-14 RX ADMIN — LISINOPRIL 2.5 MG: 5 TABLET ORAL at 09:58

## 2021-01-14 RX ADMIN — METOPROLOL TARTRATE 12.5 MG: 25 TABLET, FILM COATED ORAL at 21:30

## 2021-01-14 RX ADMIN — CANGRELOR 0.75 MCG/KG/MIN: 50 INJECTION, POWDER, LYOPHILIZED, FOR SOLUTION INTRAVENOUS at 03:14

## 2021-01-14 RX ADMIN — NITROGLYCERIN 10 MCG/MIN: 20 INJECTION INTRAVENOUS at 17:14

## 2021-01-14 RX ADMIN — Medication 10 ML: at 08:49

## 2021-01-14 RX ADMIN — METOPROLOL TARTRATE 12.5 MG: 25 TABLET, FILM COATED ORAL at 08:48

## 2021-01-14 RX ADMIN — ATORVASTATIN CALCIUM 80 MG: 80 TABLET, FILM COATED ORAL at 09:59

## 2021-01-14 RX ADMIN — ASPIRIN 325 MG ORAL TABLET 325 MG: 325 PILL ORAL at 08:48

## 2021-01-14 ASSESSMENT — ENCOUNTER SYMPTOMS
RHINORRHEA: 0
SORE THROAT: 0
DIARRHEA: 0
EYE DISCHARGE: 0
EYE REDNESS: 0
SHORTNESS OF BREATH: 0
CONSTIPATION: 0
BACK PAIN: 0
TROUBLE SWALLOWING: 0
ABDOMINAL PAIN: 0
WHEEZING: 0
COUGH: 0
NAUSEA: 0

## 2021-01-14 ASSESSMENT — PAIN SCALES - GENERAL
PAINLEVEL_OUTOF10: 0

## 2021-01-14 NOTE — PROGRESS NOTES
Results for [facility] [place] (MRN [id number]) as of 1/14/2021 17:00   Ref. Range 1/14/2021 16:23   aPTT Latest Ref Range: 24.2 - 36.2 sec 56.7 (H)       Second consecutive therapeutic aPTT. Will switch aPTT lab to daily morning lab.

## 2021-01-14 NOTE — PROGRESS NOTES
Result rec'd from lab: Pt's positive for COVID-19. Pt informed, spouse currently bedside and told to leave, provided updated visiting restrictions since pt is positive, that no visitors are currently allowed. Additionally, pt placed in Droplet Plus isolation per protocol. Admitting MD, Dr. Aurn Powell notified, as well as CVTS NP Abrahan Guillory.      Monse Lebron  1/13/2021

## 2021-01-14 NOTE — PROGRESS NOTES
Cardiovascular Progress Note      Chief Complaint:   ACS  Impression/Recommendations:    Mr. Leopoldo Anon is a 61 y.o. male patient, last seen by [facility] in 2016:     Anterior STEMI S/P POBA mid LAD 1/13/21  MVCAD  Ischemic cardiomyopathy   Obesity  Hypertension   Hyperlipidemia  CHELSEY      POBA- mid LAD stent thrombosis/in stent restenosis with restoration of flow and minimal distal LAD disease beyond to apex   Given resolution of chest pain and ECG segments,  continue Cangrelor, Heparin, and Nitroglycerin gtts during Ticagrelor metabolism and CABG workup   Continue Aspirin, statin, beta blocker; eventual low dose ACEI   Echocardiogram today   Infectious disease consult for COVID19 Rapid + 1/13/21  (PCR pending)     Interval History:   Discussed updates/plan at bedside. Rapid COVID + overnight. Denies preceding fevers/chills/cough/diarrhea/sick contacts. No shortness of breath, orthopnea, or oxygen requirements. Nitro gtt stopped last night when blood pressures consistently between 585-850 systolic. No chest pain/pressure since cath lab yesterday. No palpitations. ROS + for \"chronic back pain\".     Tele: SR 70s no events     1/13/21  Left Heart Cath  Dominance: Right       LM: 40% eccentric distal stenosis   LAD: 90% ostial to proximal stenosis; 90% mid in stent restenosis , jailed second diagonal with 30% ostial narrowing ; 100% mid stent thrombosis at distal edge   LCx: diffuse disease with 90% mid and distal stenoses, with severe disease extending into proximal OM2   RCA: 70% proximal and 85% mid stenoses; RPLB and RPDA free of significant disease      LVEDP: 17 mmHg   LVEF: 25-30%      6 Fr XB 3.5 Guide   BMW wire to apical LAD with ease  2.5 x 12 mm Trek balloon to culprit occlusion   Restoration of PAULETTE III flow to wrap around apex        Medications:      sodium chloride flush 0.9 % injection 10 mL, 2 times per day      sodium chloride flush 0.9 % injection 10 mL, PRN      acetaminophen (TYLENOL) tablet 650 mg, Q4H PRN      metoprolol tartrate (LOPRESSOR) tablet 12.5 mg, BID      nitroGLYCERIN 50 mg in dextrose 5% 250 mL infusion, Continuous      atorvastatin (LIPITOR) tablet 80 mg, Nightly      aspirin tablet 325 mg, Daily      heparin (porcine) injection 4,000 Units, PRN      heparin (porcine) injection 2,000 Units, PRN      heparin 25,000 units in dextrose 5% 250 mL infusion, Continuous      morphine (PF) injection 2 mg, Q2H PRN    Or      morphine injection 4 mg, Q2H PRN      cangrelor (KENGREAL) 50 mg in sodium chloride 0.9 % 250 mL infusion, Continuous      potassium chloride (KLOR-CON M) extended release tablet 40 mEq, PRN    Or      potassium bicarb-citric acid (EFFER-K) effervescent tablet 40 mEq, PRN    Or      potassium chloride 10 mEq/100 mL IVPB (Peripheral Line), PRN      carbidopa-levodopa (SINEMET)  MG per tablet 1 tablet, Nightly        I/O:     Intake/Output Summary (Last 24 hours) at 1/14/2021 0922  Last data filed at 1/14/2021 0827  Gross per 24 hour   Intake 897 ml   Output 850 ml   Net 47 ml       Physical Exam:    BP (!) 151/91   Pulse 85   Temp 97.8 °F (36.6 °C) (Temporal)   Resp 16   Ht 5' 5.98\" (1.676 m)   Wt 220 lb 10.9 oz (100.1 kg)   SpO2 97%   BMI 35.64 kg/m²   Wt Readings from Last 3 Encounters:   01/14/21 220 lb 10.9 oz (100.1 kg)   01/14/15 190 lb (86.2 kg)     Due to the current efforts to prevent transmission of COVID-19 and also the need to preserve PPE for other caregivers, a face-to-face encounter with the patient was not performed. That being said, all relevant records and diagnostic tests were reviewed, including laboratory results and imaging. Please reference any relevant documentation elsewhere. Care will be coordinated with the primary service.       Data Review:    CBC:   Recent Labs     01/13/21  1425 01/14/21  0304   WBC 7.8 10.0   HGB 14.1 13.9   HCT 42.7 41.8   MCV 92.3 90.5    157     BMP:   Recent Labs 01/13/21  1425 01/14/21  0304   * 138   K 3.3* 4.0   CL 99 105   CO2 22 22   BUN 24* 20   CREATININE 0.7* 0.7*   GFRAA >60 >60     LFTS:   Recent Labs     01/13/21  1425   ALT 61*   AST 45*   ALKPHOS 69   PROT 6.9   AGRATIO 1.3   BILITOT 0.5     PT/INR:   Recent Labs     01/13/21  1425   PROTIME 12.9   INR 1.11     APTT:   Recent Labs     01/13/21  1425 01/13/21  2110 01/14/21  0304   APTT >248.0* 45.2* 40.5*     Roula Wynn DO, [facility]  Interventional Cardiology     o: [phone]  [facility] [place] [address], [address]      NOTE:  This report was transcribed using voice recognition software. Every effort was made to ensure accuracy; however, inadvertent computerized transcription errors may be present.

## 2021-01-14 NOTE — CONSULTS
Infectious Diseases   Consult Note        Admission Date: 1/13/2021  Hospital Day: Hospital Day: 2   Attending: Stefany Masterson DO  Date of service: 1/14/21     Reason for admission: STEMI involving left anterior descending coronary artery [facility]) [I21.02]    Chief complaint/ Reason for consult: COVID-19 infection    Microbiology:        I have reviewed allavailable micro lab data and cultures    · Blood culture (2/2) - collected on 1/13/2021: in process    1/13/2021  7:20 PM - [facility] Incoming Lab Results From Soft (Epic Adt)         Component Value Ref Range & Units Status Collected Lab   SARS-CoV-2, NAAT DETECTEDPanic   Not Detected Final 01/13/2021  6:00 PM [address]         Antibiotics and immunizations:       Current antibiotics: All antibiotics and their doses were reviewed by me    Recent Abx Admin      No antibiotic orders with administrations found. Immunization History: All immunization history was reviewed by me today. There is no immunization history on file for this patient. Known drug allergies: All allergies were reviewed and updated    No Known Allergies    Social history:     Social History:  All social andepidemiologic history was reviewed and updated by me today as needed. · Tobacco use:   reports that he has quit smoking. He does not have any smokeless tobacco history on file. · Alcohol use:   has no history on file for alcohol. · Currently lives in: [address]  ·  has no history on file for drug. Assessment:     The patient is a 61 y.o. old male who  has no past medical history on file. with following problems:    · COVID-19 infection  · A+ blood group  · Recent STEMI, s/p emergent left heart cath on 1/13/2021 with balloon angioplasty  · Coronary artery disease s/p stents in 2015  · Obesity Class 2 due to excess calorie intake : Body mass index is 35.64 kg/m². Discussion:       This is a 80-year-old male patient, who patient. Since the patient is within 10 days of the diagnosis, is in high risk category and is not requiring supplemental oxygen, he would meet the criteria for outpatient monoclonal antibody treatment. Since the patient is currently not hospitalized for COVID-19 pneumonia but for cardiac reasons, I can check if patient can receive the monoclonal antibody treatment here  if he is interested. The patient is completely asymptomatic and is not interested in the monoclonal antibody treatment at this time  For asymptomatic individuals who test positive for COVID 19 , isolation and other precautions can be discontinued 10 days after the date of their first positive RT-PCR test for SARS-CoV-2 RNA. (http://www.[email or web address] html)  Concentrations of SARS-CoV-2 RNA measured in upper respiratory specimens decline after onset of symptoms (CDC, unpublished data, 2020; Zarina Turner al., 2020; Young et al., 4780; Jacklyn Vang et al., 2020; Richard Davila et al., 7929; Andrzej Ballesteros et al., 8211). The likelihood of recovering replication-competent virus also declines after onset of symptoms. For patients with mild to moderate MZECP-56, replication-competent virus has not been recovered after 10 days following symptom onset (CDC, unpublished data, 2020; Richard Davila et al., 2020; Rachid et al., 2020; Elidia et al., 2020; Chasity et al., 2020; Young et al., 2020; [facility]). A large contact tracing study demonstrated that high-risk household and hospital contacts did not develop infection if their exposure to a case patient started 6 days or more after the case patients illness onset ([address] [facility], 2020). Although replication-competent virus was not isolated 3 weeks after symptom onset, recovered patients can continue to have SARS-CoV-2 RNA detected in their upper respiratory specimens for up to 12 weeks ([address] [employer], 2020; Brenden Hyde et al., 0643; Natalie Flores et al, 2020).  For details of above references, visit, http://www.brooke.com/. html  Fall precautions  Continue to watch for any increasing oxygen requirements  Aspiration precautions  Cough and deep breathing exercises   Anticoagulation per primary and pulmonary  ID team will follow up only as needed        I/v access Management:    · Continue to monitor i.v access sites for erythema, induration, discharge or tenderness. · As always, continue efforts to minimizetubes/lines/drains as clinically appropriate to reduce chances of line associated infections. Current isolation precautions:    Currently active isolation(s): Droplet Plus       Level of complexity of consult: High       Weight loss counseling:    Extensive weight loss counseling was done. It is important to set a realistic weight loss goal. First goal should be to avoid gaining more weight and staying at current weight (or within 5 percent). People at high risk of developing diabetes who are able to lose 5 percent of their body weight and maintain this weight will reduce their risk of developing diabetes by about 50 percent and reduce their blood pressure. Losing more than 15 percent of  body weight and staying at this weight is an extremely good result, even if you never reach your \"dream\" or \"ideal\" weight. Lifestyle changes including changing eating habits, substituting excess carbohydrates with proteins, stress reduction, using self-help programs like Weight Watchers®, Overeaters Anonymous®, and Take Off Pounds Sensibly (TOPS)© , following DASH diet and increasing exercise or walking briskly daily for half hour to and hour 5-7 days a week was suggested among other measures.  Information was given about various weight loss education programs and their websites like www.cdc.gov/healthyweight, www.choosemyplate.gov and www.health.gov/dietaryguidelines/    Smoking cessation/ Tobacco use disorder counseling:    I have counseled the patient extensively about risks of smoking and have encouraged the patient to maintain a healthy smoke-free lifestyle. Information was given about various smoking cessation programs and their websites like www.smokefree.gov, [place]. quitlogix. org as well as help lines like 1-800-QUIT-NOW and 1-800-LUNG-[place]. Thank you for involving me in the care of your patient. I will continue to follow only on as-needed basis. If you have any additional questions, please do not hesitate to contact me. Subjective:     Presenting complaint in ER:     No chief complaint on file. HPI: Joselo Lawler is a 61 y.o. male patient, who was seen at the request of Dr. Chanel Daniel DO. History was obtained from chart review and with use of telehealth equipment directly from the patient and with the help of RN as patient is in COVID-19 isolation. The patient was admitted on 1/13/2021. I have been consulted to see the patient for above mentioned reason(s). The patient has multiple medical comorbidities, and presented to the ER for crushing chest pain. Patient is history of coronary artery disease and had a drug-eluting stents placed in 2015. The patient was working on the deck to clean the deck to sell their house, became acutely diaphoretic and started having severe chest pain. He came to the ER, was found to have a STEMI and underwent urgent cardiac cath yesterday. I have been asked for my opinion for management for this patient. A balloon angioplasty was done with the restoration of flow. A CABG has been recommended by cardiology team.    The patient had a rapid COVID-19 test done yesterday, which came back positive                   Past Medical History: All past medical history reviewed today. No past medical history on file. Past Surgical History: All pastsurgical history was reviewed today. Past Surgical History:   Procedure Laterality Date    ELBOW SURGERY      rt elbow         Family History:  All family history was reviewed today.    No family history on file. Medications: All current and past medications were reviewed. Medications Prior to Admission: methocarbamol (ROBAXIN) 500 MG tablet,   rOPINIRole (REQUIP) 0.5 MG tablet,      lisinopril  2.5 mg Oral Daily    atorvastatin  80 mg Oral Daily    sodium chloride flush  10 mL Intravenous 2 times per day    metoprolol tartrate  12.5 mg Oral BID    aspirin  325 mg Oral Daily    carbidopa-levodopa  1 tablet Oral Nightly          REVIEW OF SYSTEMS:   (Obtained with use of telehealth equipment directly from the patient and with the help of RN as patient is in COVID-19 isolation)    Review of Systems   Constitutional: Negative for chills, diaphoresis and fever. HENT: Negative for ear discharge, ear pain, rhinorrhea, sore throat and trouble swallowing. Eyes: Negative for discharge and redness. Respiratory: Negative for cough, shortness of breath and wheezing. Cardiovascular: Negative for chest pain and leg swelling. Gastrointestinal: Negative for abdominal pain, constipation, diarrhea and nausea. Endocrine: Negative for polyuria. Genitourinary: Negative for dysuria, flank pain, frequency, hematuria and urgency. Musculoskeletal: Negative for back pain and myalgias. Skin: Negative for rash. Neurological: Negative for dizziness, seizures and headaches. Hematological: Does not bruise/bleed easily. Psychiatric/Behavioral: Negative for hallucinations and suicidal ideas. All other systems reviewed and are negative.        Objective:       PHYSICAL EXAM:      Vitals:   Vitals:    01/13/21 2331 01/14/21 0242 01/14/21 0333 01/14/21 0827   BP: 132/84 134/80  (!) 151/91   Pulse: 74 74  85   Resp: 16 16  16   Temp: 97.6 °F (36.4 °C) 97.8 °F (36.6 °C)  97.8 °F (36.6 °C)   TempSrc: Temporal Temporal  Temporal   SpO2: 98% 98%  97%   Weight:   220 lb 10.9 oz (100.1 kg)    Height:           Physical Exam     PHYSICAL EXAM:     In-person bedside physical examination deferred. Pursuant to the emergency declaration under the [address] [facility] authority and the [employer] Act, this clinical encounter was conducted to provide necessary medical care. (Also consistent with new provisions and guidance offered by [place]oc Muir on March 18, 2020 in setting of COVID 19 outbreak and in order to minimize exposures in accordance with the flexibilities announced by [facility] on March 30, 2020). In person physical examination was not conducted at bedside in this patient in COVID-19 isolation room to avoid unnecessary exposures, as it will not change my management plan for this patient. In accordance with the guidelines issued by CMS during the public health emergency, two way communication was conducted with the patient with the help of tele-health equipment. References: https://[facility]. [facility]/Portals/0/Resources/COVID-19/3_18%20Telemed%20Guidance%20Updated%20March%2018. pdf?mgt=[email or web address]                      https://[facility]. [facility]/Portals/0/Resources/COVID-19/3_18%20Telemed%20Guidance%20Updated%20March%2018. pdf?nrg=[email or web address]                      http://[email or web address] pdf                            General: Awake,  vitals reviewed  HEENT: Deferred  Cardiovascular: Telemetry data reviewed, rest deferred   Pulmonary: deferred  Abdomen/GI: deferred  Neuro: deferred  Skin: deferred  Musculoskeletal:  deferred  Genitourinary: Deferred  Psych: deferred  Lymphatic/Immunologic: deferred    Intake and output:     I/O last 3 completed shifts: In: 134 [P.O.:240; I.V.:417]  Out: 850 [Urine:850]    Lab Data:   All available labs were reviewed by me today.      CBC:   Recent Labs     01/13/21  1425 01/14/21  0304   WBC 7.8 10.0   RBC 4.63 4.61   HGB 14.1 13.9   HCT 42.7 41.8    157   MCV 92.3 90.5   MCH 30.5 30.1   MCHC 33.0 33.3   RDW 13.4 13.1        BMP:  Recent Labs     01/13/21  1425 01/14/21  0304   * 138   K 3.3* 4.0   CL 99 105   CO2 22 22   BUN 24* 20   CREATININE 0.7* 0.7*   CALCIUM 8.6 8.9   GLUCOSE 123* 105*        Hepatic FunctionPanel:   Lab Results   Component Value Date    ALKPHOS 69 01/13/2021    ALT 61 01/13/2021    AST 45 01/13/2021    PROT 6.9 01/13/2021    BILITOT 0.5 01/13/2021    LABALBU 3.9 01/13/2021       CPK: No results found for: CKTOTAL  ESR: No results found for: SEDRATE  CRP: No results found for: CRP      Imaging: All pertinent images and reports for the current visit were reviewed by meduring this visit. VL PRE OP VEIN MAPPING    (Results Pending)   VL DUP CAROTID BILATERAL    (Results Pending)   CT CHEST WO CONTRAST    (Results Pending)       Outside records:    Labs, Microbiology, Radiology and pertinent results from Care everywhere, if available, were reviewed as a part ofthe consultation. Problem list:       Patient Active Problem List   Diagnosis Code    ST elevation myocardial infarction (STEMI) ([facility] [address] I21.3    STEMI involving left anterior descending coronary artery ([facility] [address] I21.02         Please note that this chart was generated using Dragon dictation software. Although every effort was made to ensure the accuracy of this automated transcription, some errors in transcription may have occurred inadvertently. If you may need any clarification, please do not hesitate to contact me through EPIC or at the phone number provided below with my electronic signature. Any pictures or media included in this note were obtained after taking informed verbal consent from the patient and with their approval to include those in the patient's medical record.       Michael Arredondo MD, MPH  1/14/21, 11:21 AM [facility] Infectious Disease   [address] [place] [address], [address] [facility] [address], [address] [place] [address]  Office: [phone]  Fax: [phone]  Clinic days:  Tuesday & Thursday

## 2021-01-14 NOTE — CARE COORDINATION
Discharge Planning Assessment  Readmission risk score 9%  RN discharge planner met with patient/ (and family member) to discuss reason for admission, current living situation, and potential needs at the time of discharge    Demographics/Insurance verified Yes address and insurance verified with patient    Current type of dwellin level home    Patient from ECF/SW confirmed with: n/a    Living arrangements:with wife    Level of function/Support:independent    PCP: Rheba Ahumada with [facility]    Last Visit to PCP: 4 months ago, has a visit scheduled 2021    DME:none    Active with any community resources/agencies/skilled home care:  None    Medication compliance issues:none, mail order pharmacy [facility], short term prescriptions are filled at [facility] issues that could impact healthcare:  none      Tentative discharge plan: home    Discussed and provided facilities of choice if transition to a skilled nursing facility is required at the time of discharge      Discussed with patient and/or family that on the day of discharge home tentative time of discharge will be between 10 AM and noon.     Transportation at the time of discharge: wife    AGUSTÍN Andrews, CCM, RN  [facility]  [phone]

## 2021-01-14 NOTE — PROGRESS NOTES
Results for Angelia Grijalva (MRN [id number]) as of 1/14/2021 10:52   Ref. Range 1/14/2021 10:00   aPTT Latest Ref Range: 24.2 - 36.2 sec 66.5 (H)       No change made to gtt.

## 2021-01-15 LAB
ANION GAP SERPL CALCULATED.3IONS-SCNC: 12 MMOL/L (ref 3–16)
APTT: 62 SEC (ref 24.2–36.2)
BUN BLDV-MCNC: 18 MG/DL (ref 7–20)
CALCIUM SERPL-MCNC: 8.8 MG/DL (ref 8.3–10.6)
CHLORIDE BLD-SCNC: 105 MMOL/L (ref 99–110)
CO2: 21 MMOL/L (ref 21–32)
CREAT SERPL-MCNC: 0.7 MG/DL (ref 0.8–1.3)
GFR AFRICAN AMERICAN: >60
GFR NON-AFRICAN AMERICAN: >60
GLUCOSE BLD-MCNC: 111 MG/DL (ref 70–99)
HCT VFR BLD CALC: 40.4 % (ref 40.5–52.5)
HEMOGLOBIN: 13.4 G/DL (ref 13.5–17.5)
MCH RBC QN AUTO: 30.1 PG (ref 26–34)
MCHC RBC AUTO-ENTMCNC: 33.2 G/DL (ref 31–36)
MCV RBC AUTO: 90.6 FL (ref 80–100)
PDW BLD-RTO: 13.4 % (ref 12.4–15.4)
PLATELET # BLD: 144 K/UL (ref 135–450)
PMV BLD AUTO: 9.5 FL (ref 5–10.5)
POTASSIUM SERPL-SCNC: 3.9 MMOL/L (ref 3.5–5.1)
RBC # BLD: 4.46 M/UL (ref 4.2–5.9)
SODIUM BLD-SCNC: 138 MMOL/L (ref 136–145)
WBC # BLD: 7.6 K/UL (ref 4–11)

## 2021-01-15 PROCEDURE — 2580000003 HC RX 258: Performed by: INTERNAL MEDICINE

## 2021-01-15 PROCEDURE — 6370000000 HC RX 637 (ALT 250 FOR IP): Performed by: INTERNAL MEDICINE

## 2021-01-15 PROCEDURE — 6360000002 HC RX W HCPCS: Performed by: INTERNAL MEDICINE

## 2021-01-15 PROCEDURE — C9460 INJECTION, CANGRELOR: HCPCS | Performed by: INTERNAL MEDICINE

## 2021-01-15 PROCEDURE — 99233 SBSQ HOSP IP/OBS HIGH 50: CPT | Performed by: INTERNAL MEDICINE

## 2021-01-15 PROCEDURE — 85730 THROMBOPLASTIN TIME PARTIAL: CPT

## 2021-01-15 PROCEDURE — 85027 COMPLETE CBC AUTOMATED: CPT

## 2021-01-15 PROCEDURE — 6370000000 HC RX 637 (ALT 250 FOR IP): Performed by: NURSE PRACTITIONER

## 2021-01-15 PROCEDURE — 80048 BASIC METABOLIC PNL TOTAL CA: CPT

## 2021-01-15 PROCEDURE — 2000000000 HC ICU R&B

## 2021-01-15 RX ORDER — LISINOPRIL 5 MG/1
2.5 TABLET ORAL 2 TIMES DAILY
Status: DISCONTINUED | OUTPATIENT
Start: 2021-01-15 | End: 2021-01-21 | Stop reason: HOSPADM

## 2021-01-15 RX ORDER — CETIRIZINE HYDROCHLORIDE 10 MG/1
10 TABLET ORAL DAILY
Status: DISCONTINUED | OUTPATIENT
Start: 2021-01-15 | End: 2021-01-21 | Stop reason: HOSPADM

## 2021-01-15 RX ADMIN — METOPROLOL TARTRATE 12.5 MG: 25 TABLET, FILM COATED ORAL at 08:48

## 2021-01-15 RX ADMIN — LISINOPRIL 2.5 MG: 5 TABLET ORAL at 08:48

## 2021-01-15 RX ADMIN — METOPROLOL TARTRATE 12.5 MG: 25 TABLET, FILM COATED ORAL at 20:19

## 2021-01-15 RX ADMIN — LISINOPRIL 2.5 MG: 5 TABLET ORAL at 16:47

## 2021-01-15 RX ADMIN — CANGRELOR 0.75 MCG/KG/MIN: 50 INJECTION, POWDER, LYOPHILIZED, FOR SOLUTION INTRAVENOUS at 16:51

## 2021-01-15 RX ADMIN — CETIRIZINE HYDROCHLORIDE 10 MG: 10 TABLET, FILM COATED ORAL at 16:46

## 2021-01-15 RX ADMIN — ASPIRIN 325 MG ORAL TABLET 325 MG: 325 PILL ORAL at 08:47

## 2021-01-15 RX ADMIN — CARBIDOPA AND LEVODOPA 1 TABLET: 25; 100 TABLET ORAL at 20:19

## 2021-01-15 RX ADMIN — Medication 10 ML: at 20:19

## 2021-01-15 RX ADMIN — ATORVASTATIN CALCIUM 80 MG: 80 TABLET, FILM COATED ORAL at 08:47

## 2021-01-15 ASSESSMENT — PAIN SCALES - GENERAL: PAINLEVEL_OUTOF10: 0

## 2021-01-15 NOTE — ADT AUTH CERT
1/15/2021 1:02 PM EST by Mehnaz Looney      cangrelor [facility]) 50 mg in sodium chloride 0.9 % 250 mL infusion   Rate: 22.4 mL/hr Dose: 0.75 mcg/kg/min    aspirin tablet 325 mg qd    (X) Beta-blocker    1/15/2021 1:02 PM EST by Mehnaz Looney      metoprolol tartrate (LOPRESSOR) tablet 12.5 mg bid    (X) Possible ACE inhibitor or ARB    1/15/2021 1:02 PM EST by Mehnaz Looney      lisinopril (PRINIVIL;ZESTRIL) tablet 2.5 mg qd    (X) Statin    1/15/2021 1:02 PM EST by Mehnaz Looney      atorvastatin (LIPITOR) tablet 80 mg hs    * Milestone   Additional Notes   1/14      ICU      CV sug   Hypertensive, RA, afebrile.  Alert and oriented X 3. He is currently chest pain free and denies any shortness of breath. Covid positive 1/13 (asymptomatic). Had Brilinta on 1/13. Vitals:     01/14/21 0827   BP: 151/91   Pulse: 85   Resp: 16   Temp: 97.8 °F (36.6 °C)   SpO2: 97%       Gtts: cangelor, heparin, NTG       Physical Exam:    Cardiac:  NSR   Lungs: clear to auscultation   Vascular:  pulses all palpable    Extremities: no edema   :   last 16 hours   Incision(s):     Right radial puncture site- no hematoma, no oozing          Plan:    -Brilinta metabolism- last dose 1/13   -Covid positive per rapid test.  PCR also positive    - ID consult pending   - Patient might not be a surgical candidate for CABG during this hospital admission. Will discuss with cardiology medical management vs High risk PCI pending ID further recommendations.      ---------------------   ID      General: Awake,  vitals reviewed   HEENT: Deferred   Cardiovascular: Telemetry data reviewed, rest deferred    Pulmonary: deferred      Assessment:       The patient is a 61 y.o. old male who    Past Medical History in prose (no negatives)    has no past medical history on file.    with following problems:       · COVID-19 infection   · A+ blood group   · Recent STEMI, s/p emergent left heart cath on 1/13/2021 with balloon angioplasty · Coronary artery disease s/p stents in 2015   · Obesity Class 2 due to excess calorie intake :  Body mass index is 35.64 kg/m².            Discussion:         This is a 80-year-old male patient, who presented with ST elevation myocardial infarction and had an emergent balloon angioplasty during left heart cath done yesterday.       He tested positive for COVID-19 during rapid testing.  It is possible that COVID-19 infection might have contributed as a trigger to his acute coronary syndrome episode in addition to acute exertion while cleaning his deck, when the symptoms started.       He is saturating 97 to 99% on room air       Due to his age, obesity, and A positive blood group, he is at risk of developing COVID-19 pneumonia, however, he is currently not hypoxic       I have reviewed the images of CT scan of the chest done today.  It does not show any obvious pulmonary infiltrates.  The patient essentially has asymptomatic COVID-19 at this time       I conducted a two-way communication with the patient with the help of telehealth equipment. [facility] denies any shortness of breath, cough, headache, nausea or vomiting, diarrhea, loss of taste or smell at this time. [facility] does not recall any particular exposures to COVID-19       Plan:       Diagnostic Workup:           · No labs needed at this point   · Follow-up on COVID-19 PCR test               Antimicrobials:       · Since the patient is not hypoxic and not requiring any supplemental oxygen, he does not meet the criteria for remdesivir or Decadron or convalescent plasma at this time   · Continue to watch for development of any hypoxia or need for supplemental oxygen   · If the patient continues to do well, can plan for discharge home with home isolation precautions whenever okay with cardiology and CT surgery team. [facility] can finish the rest of the isolation period at home   · Continue COVID-19 isolation precautions.  If the patient gets any surgical procedures including CABG, will recommend full personal protective equipment for the team doing the procedure including N95 masks, eye protection, gown and gloves   ---------------------   CArdio   Discussed updates/plan at bedside. Rapid COVID + overnight. Denies preceding fevers/chills/cough/diarrhea/sick contacts. No shortness of breath, orthopnea, or oxygen requirements. Nitro gtt stopped last night when blood pressures consistently between 348-381 systolic.  No chest pain/pressure since cath lab yesterday. No palpitations. ROS + for \"chronic back pain\". Impression/Recommendations:       Mr. Rafi Bright is a 61 y. o. male patient, last seen by [address] [place] [address] Cardiology in 2016:       Anterior STEMI S/P POBA mid LAD 1/13/21   MVCAD   Ischemic cardiomyopathy    Obesity   Hypertension    Hyperlipidemia   CHELSEY           POBA- mid LAD stent thrombosis/in stent restenosis with restoration of flow and minimal distal LAD disease beyond to apex    Given resolution of chest pain and ECG segments,  continue Cangrelor, Heparin, and Nitroglycerin gtts during Ticagrelor metabolism and CABG workup    Continue Aspirin, statin, beta blocker; eventual low dose ACEI    Echocardiogram today    Infectious disease consult for COVID19 Rapid + 1/13/21  (PCR pending)    ------------------------      1/14/2021 03:04   Sodium: 138   Potassium: 4.0   Chloride: 105   CO2: 22   BUN: 20   Creatinine: 0.7 (L)   Anion Gap: 11   GFR Non-: >60   GFR African American: >60   Glucose: 105 (H)   Calcium: 8.9   WBC: 10.0   RBC: 4.61   Hemoglobin Quant: 13.9   Hematocrit: 41.8   MCV: 90.5   MCH: 30.1   MCHC: 33.3   MPV: 9.3   RDW: 13.1   Platelet Count: 447   aPTT: 40.5 (H)      1/14/2021 10:00   aPTT: 66.5 (H)      1/14/2021 16:23   aPTT: 56.7 (H)   ---------------------   CT Chest   Coronary artery calcification.  No aortic aneurysm.  Trace atherosclerosis   seen in the aortic arch       Small nodular density along the left major fissure, likely intrapulmonary   lymph node by location.   ----------------------   Echo results above   -------------   Scheduled Medications   · lisinopril 2.5 mg Oral Daily   · atorvastatin 80 mg Oral Daily   · sodium chloride flush 10 mL Intravenous 2 times per day   · metoprolol tartrate 12.5 mg Oral BID   · aspirin 325 mg Oral Daily   · carbidopa-levodopa 1 tablet Oral Nightly      Infusions noted above            COVID 19 by Jennyfer Mayen RN       Review Status Review Entered   In Primary 1/14/2021 11:54      Criteria Review   The illness suspected to be related to the Coronavirus (COVID-19)? Yes,   Has the member been tested for the COVID-19? Yes  If Yes, what are the results of the COVID-19? Positive  What is the severity of the members condition (i.e. Isolation, Ventilator use)?    Presents with acute STEMI-   [facility] finding- mid LAD stent thrombosis with restoration of flow and minimal distal LAD disease   No O2 need at this time

## 2021-01-15 NOTE — PROGRESS NOTES
stent thrombosis with restoration of flow and minimal distal LAD disease   Discussed with CT surgeon Dr. Genoveva Cifuentes in cath lab. Given complete resolution of chest pain and ECG segments, will transition Brilinta load to Cangrelor gtt and work up for CABG. Transfer to CVU on Cangrelor, Heparin, and Nitroglycerin gtts. Personally reviewed cath films with Dr. Rishabh Leahy and Dr. Apryl Simons. Agree with above interpretation. No viable percutaneous option seen    ECHO (personally reviewed images) Agree-Anterior apical HK, EF=40-45, minimal MR  Summary   Overall, left ventricular systolic function is mildly depressed. Ejection fraction is visually estimated to be 40-45%. Mild mitral regurgitation. The right ventricle is normal in size and function. Assessment:      Anterior MI:  POBA of prior LAD stent. Anterior apical HK. Appears improved some on ECHO  Remains on heparin and Cangrelor. CAD:  Severe CAD with complex LAD and LCX disease. Discussed with Dr. Carolynn Jade, and reviewed Images with Dr.s Rishabh Leahy and Kasey.-No viable percutaneous option. Needs CABG  CTVS seeing pt- Obvious issue is COVID positivity    LV Dysfunction:  Mild-moderate  On lisinopril and lopressor  BP slightly high  Increase lisinopril to 2.5 BID    COVID:  Rapid and PCR positive. No symptoms  This presents obvious issues for surgical timing.       Anastasia Jernigan MD, 1/15/2021 9:57 AM

## 2021-01-15 NOTE — PROGRESS NOTES
CC: 3V CAD, s/p NSTEMI, COVID+    Stable overnight  With COVID best option likely PCI or medicine until recovers  Will discuss with Dr. Sendy Garay

## 2021-01-15 NOTE — PROGRESS NOTES
Results for Chi Plump (MRN [id number]) as of 1/15/2021 04:04   Ref.  Range 1/15/2021 03:00   aPTT Latest Ref Range: 24.2 - 36.2 sec 62.0 (H)   No bolus no change

## 2021-01-16 LAB
ANION GAP SERPL CALCULATED.3IONS-SCNC: 12 MMOL/L (ref 3–16)
APTT: 55.6 SEC (ref 24.2–36.2)
BUN BLDV-MCNC: 18 MG/DL (ref 7–20)
CALCIUM SERPL-MCNC: 8.8 MG/DL (ref 8.3–10.6)
CHLORIDE BLD-SCNC: 107 MMOL/L (ref 99–110)
CO2: 21 MMOL/L (ref 21–32)
CREAT SERPL-MCNC: 0.8 MG/DL (ref 0.8–1.3)
GFR AFRICAN AMERICAN: >60
GFR NON-AFRICAN AMERICAN: >60
GLUCOSE BLD-MCNC: 113 MG/DL (ref 70–99)
HCT VFR BLD CALC: 41.9 % (ref 40.5–52.5)
HEMOGLOBIN: 13.7 G/DL (ref 13.5–17.5)
MCH RBC QN AUTO: 29.8 PG (ref 26–34)
MCHC RBC AUTO-ENTMCNC: 32.6 G/DL (ref 31–36)
MCV RBC AUTO: 91.4 FL (ref 80–100)
PDW BLD-RTO: 13.3 % (ref 12.4–15.4)
PLATELET # BLD: 145 K/UL (ref 135–450)
PMV BLD AUTO: 9.7 FL (ref 5–10.5)
POTASSIUM SERPL-SCNC: 4.1 MMOL/L (ref 3.5–5.1)
RBC # BLD: 4.59 M/UL (ref 4.2–5.9)
SODIUM BLD-SCNC: 140 MMOL/L (ref 136–145)
WBC # BLD: 8.2 K/UL (ref 4–11)

## 2021-01-16 PROCEDURE — C9460 INJECTION, CANGRELOR: HCPCS | Performed by: INTERNAL MEDICINE

## 2021-01-16 PROCEDURE — 2580000003 HC RX 258: Performed by: INTERNAL MEDICINE

## 2021-01-16 PROCEDURE — 2000000000 HC ICU R&B

## 2021-01-16 PROCEDURE — 80048 BASIC METABOLIC PNL TOTAL CA: CPT

## 2021-01-16 PROCEDURE — 6370000000 HC RX 637 (ALT 250 FOR IP): Performed by: INTERNAL MEDICINE

## 2021-01-16 PROCEDURE — 85730 THROMBOPLASTIN TIME PARTIAL: CPT

## 2021-01-16 PROCEDURE — 85027 COMPLETE CBC AUTOMATED: CPT

## 2021-01-16 PROCEDURE — 6360000002 HC RX W HCPCS: Performed by: INTERNAL MEDICINE

## 2021-01-16 PROCEDURE — 99291 CRITICAL CARE FIRST HOUR: CPT | Performed by: INTERNAL MEDICINE

## 2021-01-16 RX ADMIN — CANGRELOR 0.75 MCG/KG/MIN: 50 INJECTION, POWDER, LYOPHILIZED, FOR SOLUTION INTRAVENOUS at 20:29

## 2021-01-16 RX ADMIN — METOPROLOL TARTRATE 12.5 MG: 25 TABLET, FILM COATED ORAL at 20:00

## 2021-01-16 RX ADMIN — LISINOPRIL 2.5 MG: 5 TABLET ORAL at 18:14

## 2021-01-16 RX ADMIN — CARBIDOPA AND LEVODOPA 1 TABLET: 25; 100 TABLET ORAL at 20:01

## 2021-01-16 RX ADMIN — CANGRELOR 0.75 MCG/KG/MIN: 50 INJECTION, POWDER, LYOPHILIZED, FOR SOLUTION INTRAVENOUS at 04:40

## 2021-01-16 ASSESSMENT — PAIN SCALES - GENERAL
PAINLEVEL_OUTOF10: 0
PAINLEVEL_OUTOF10: 0

## 2021-01-16 NOTE — PROGRESS NOTES
CC: 3V CAD, COVID, s/p STEMI    No c/o  Brief episode of arm pain this morning after he got up to go to bathroom  NSR  NTG only at 10 with /105  CTAB RRR  As per CC  Increase NTG  Updated patient to plan and answered all his questions and expressed sympathy for isolation conditions  To re-discuss options Monday with cardiology

## 2021-01-16 NOTE — PROGRESS NOTES
Cardiology Progress Note     Admit Date: 2021     Reason for follow up: STEMI    Interval History:   - Patient seen and examined. - Clinical notes reviewed. - Telemetry reviewed. No significant arrhythmias.  - Mild chest pain overnight with activity. Resolved with rest.  No chest pain currently. - Denies shortness of breath, dyspnea on exertion, Orthopnea, PND at the time of this visit. Physical Examination:  Vitals:    21 0700   BP:    Pulse:    Resp: 18   Temp: 97 °F (36.1 °C)   SpO2:         Intake/Output Summary (Last 24 hours) at 2021 0800  Last data filed at 1/15/2021 1911  Gross per 24 hour   Intake 740 ml   Output 300 ml   Net 440 ml     In: 740 [P.O.:480; I.V.:260]  Out: -    Wt Readings from Last 3 Encounters:   21 215 lb 13.3 oz (97.9 kg)   01/14/15 190 lb (86.2 kg)     Temp  Av.5 °F (36.4 °C)  Min: 97 °F (36.1 °C)  Max: 98 °F (36.7 °C)  Pulse  Av.2  Min: 70  Max: 80  BP  Min: 119/79  Max: 163/97  SpO2  Av %  Min: 98 %  Max: 98 %    · Telemetry: Sinus rhythm   · Constitutional: Alert. Oriented to person, place, and time. No distress. · Head: Normocephalic and atraumatic. · Mouth/Throat: Lips appear moist. Oropharynx is clear and moist.  · Eyes: Conjunctivae normal. EOM are normal.   · Neck: Neck supple. No lymphadenopathy. No rigidity. No JVD present. · Cardiovascular: Normal rate, regular rhythm. Normal S1&S2. · Pulmonary/Chest: Bilateral respiratory sounds present. No respiratory accessory muscle use. No wheezes, No rhonchi. · Abdominal: Soft. Normal bowel sounds present. No distension, No tenderness. No splenomegaly. No hernia. · Neurological: Alert and oriented. Cranial nerve II-XII grossly intact. · Skin: Skin is warm and dry. No rash, lesions, ulcerations noted. · Psychiatric: No anxiety nor agitation. Labs, diagnostic and imaging results reviewed. Reviewed.    Recent Labs     21  0304 01/15/21  0300 21  0450    138 140   K 4.0 3.9 4.1    105 107   CO2 22 21 21   BUN 20 18 18   CREATININE 0.7* 0.7* 0.8     Recent Labs     01/14/21  0304 01/15/21  0300 01/16/21  0450   WBC 10.0 7.6 8.2   HGB 13.9 13.4* 13.7   HCT 41.8 40.4* 41.9   MCV 90.5 90.6 91.4    144 145     No results found for: CKTOTAL, CKMB, CKMBINDEX, TROPONINI  Estimated Creatinine Clearance: 108 mL/min (based on SCr of 0.8 mg/dL).    No results found for: BNP  Lab Results   Component Value Date    PROTIME 12.9 01/13/2021    INR 1.11 01/13/2021     Lab Results   Component Value Date    CHOL 145 01/13/2021    HDL 41 01/13/2021    TRIG 173 01/13/2021       Scheduled Meds:   lisinopril  2.5 mg Oral BID    cetirizine  10 mg Oral Daily    atorvastatin  80 mg Oral Daily    sodium chloride flush  10 mL Intravenous 2 times per day    metoprolol tartrate  12.5 mg Oral BID    aspirin  325 mg Oral Daily    carbidopa-levodopa  1 tablet Oral Nightly     Continuous Infusions:   nitroGLYCERIN 10 mcg/min (01/16/21 0554)    heparin (PORCINE) Infusion 10 Units/kg/hr (01/15/21 1653)    cangrelor (KENGREAL) infusion 0.75 mcg/kg/min (01/16/21 0440)     PRN Meds:sodium chloride flush, acetaminophen, heparin (porcine), heparin (porcine), morphine **OR** morphine, potassium chloride **OR** potassium alternative oral replacement **OR** potassium chloride     Patient Active Problem List    Diagnosis Date Noted    COVID-19     Ex-smoker     Weight loss counseling, encounter for     Tobacco abuse counseling     Coronary artery disease due to lipid rich plaque     Class 2 obesity due to excess calories with body mass index (BMI) of 35.0 to 35.9 in adult     STEMI involving left anterior descending coronary artery ([facility] [address] 01/13/2021    ST elevation myocardial infarction (STEMI) [facility])       Active Hospital Problems    Diagnosis Date Noted    COVID-19 [U07.1]     Ex-smoker [Z87.891]     Weight loss counseling, encounter for [Z71.3]     Tobacco abuse counseling [Z71.6]     Coronary artery disease due to lipid rich plaque [I25.10, I25.83]     Class 2 obesity due to excess calories with body mass index (BMI) of 35.0 to 35.9 in adult [E66.09, Z68.35]     STEMI involving left anterior descending coronary artery (HCC) [I21.02] 01/13/2021    ST elevation myocardial infarction (STEMI) [facility]) [I21.3]      Mr. Christopher Reyes is a pleasant 61year old male with a medical history significant for coronary artery disease status post PCI to mid LAD (02/2015), hypertension, hyperlipidemia and remote history of tobacco abuse who presented to [facility] with STEMI. Assessment and Plan:   1. Ischemic cardiomyopathy/STEMI. Patient is a pleasant 61year old male with a medical history significant for ischemic cardiomyopathy status post PCI to mid LAD, hypertension, hyperlipidemia, and obesity who presented from home with STEMI. He underwent LHC on 01/13/2021 which showed stent thrombosis, severe LCx disease, and severe RCA disease. He was referred to CTS for revascularization and is currently awaiting revascularization planning. This has been complicated by his diagnosis of COVID19.  - Continue high dose statin. - Continue aspirin.  - Continue lisinopril 2.5 mg BID.  - Continue metoprolol tartrate 12.5 mg BID.  - Continue cangrelor.  - Continue heparin drip. - Continue nitro drip. Will hold off on increasing antihypertensives in case need some blood pressure if we need to increase his nitro drip as he has exertional chest pain. - If chest pain worsening we will consider LHC. - We will continue to follow along with you. 2. COVID19. No respiratory distress. Not on oxygen. - Per primary team.    3. Hypertension.  - Plan as per above. Thank you for allowing me to participate in the care of this patient. If you have any questions, please do not hesitate to contact me.     Valeria Davis MD  Cardiac Electrophysiology  [address] [facility]  [phone] [employer]

## 2021-01-16 NOTE — PLAN OF CARE
Problem: Airway Clearance - Ineffective  Goal: Achieve or maintain patent airway  1/15/2021 2114 by LYDIA Geiger - CNP  Outcome: Ongoing  1/15/2021 1501 by Mazin Downing RN  Outcome: Ongoing  1/15/2021 1500 by Mazin Downing RN  Outcome: Ongoing

## 2021-01-17 LAB
ANION GAP SERPL CALCULATED.3IONS-SCNC: 10 MMOL/L (ref 3–16)
APTT: 62.8 SEC (ref 24.2–36.2)
BUN BLDV-MCNC: 17 MG/DL (ref 7–20)
CALCIUM SERPL-MCNC: 9.1 MG/DL (ref 8.3–10.6)
CHLORIDE BLD-SCNC: 107 MMOL/L (ref 99–110)
CO2: 21 MMOL/L (ref 21–32)
CREAT SERPL-MCNC: 0.8 MG/DL (ref 0.8–1.3)
GFR AFRICAN AMERICAN: >60
GFR NON-AFRICAN AMERICAN: >60
GLUCOSE BLD-MCNC: 108 MG/DL (ref 70–99)
HCT VFR BLD CALC: 40.9 % (ref 40.5–52.5)
HEMOGLOBIN: 13.8 G/DL (ref 13.5–17.5)
MCH RBC QN AUTO: 30.8 PG (ref 26–34)
MCHC RBC AUTO-ENTMCNC: 33.7 G/DL (ref 31–36)
MCV RBC AUTO: 91.4 FL (ref 80–100)
PDW BLD-RTO: 13.3 % (ref 12.4–15.4)
PLATELET # BLD: 141 K/UL (ref 135–450)
PMV BLD AUTO: 9.1 FL (ref 5–10.5)
POTASSIUM SERPL-SCNC: 4 MMOL/L (ref 3.5–5.1)
RBC # BLD: 4.48 M/UL (ref 4.2–5.9)
SODIUM BLD-SCNC: 138 MMOL/L (ref 136–145)
WBC # BLD: 8.6 K/UL (ref 4–11)

## 2021-01-17 PROCEDURE — C9460 INJECTION, CANGRELOR: HCPCS | Performed by: INTERNAL MEDICINE

## 2021-01-17 PROCEDURE — 85027 COMPLETE CBC AUTOMATED: CPT

## 2021-01-17 PROCEDURE — 2000000000 HC ICU R&B

## 2021-01-17 PROCEDURE — 6370000000 HC RX 637 (ALT 250 FOR IP): Performed by: INTERNAL MEDICINE

## 2021-01-17 PROCEDURE — 99291 CRITICAL CARE FIRST HOUR: CPT | Performed by: INTERNAL MEDICINE

## 2021-01-17 PROCEDURE — 6370000000 HC RX 637 (ALT 250 FOR IP): Performed by: NURSE PRACTITIONER

## 2021-01-17 PROCEDURE — 80048 BASIC METABOLIC PNL TOTAL CA: CPT

## 2021-01-17 PROCEDURE — 2580000003 HC RX 258: Performed by: INTERNAL MEDICINE

## 2021-01-17 PROCEDURE — 85730 THROMBOPLASTIN TIME PARTIAL: CPT

## 2021-01-17 PROCEDURE — 6360000002 HC RX W HCPCS: Performed by: INTERNAL MEDICINE

## 2021-01-17 RX ADMIN — ATORVASTATIN CALCIUM 80 MG: 80 TABLET, FILM COATED ORAL at 08:01

## 2021-01-17 RX ADMIN — ASPIRIN 325 MG ORAL TABLET 325 MG: 325 PILL ORAL at 07:59

## 2021-01-17 RX ADMIN — METOPROLOL TARTRATE 12.5 MG: 25 TABLET, FILM COATED ORAL at 08:02

## 2021-01-17 RX ADMIN — LISINOPRIL 2.5 MG: 5 TABLET ORAL at 17:50

## 2021-01-17 RX ADMIN — METOPROLOL TARTRATE 12.5 MG: 25 TABLET, FILM COATED ORAL at 20:47

## 2021-01-17 RX ADMIN — CANGRELOR 0.75 MCG/KG/MIN: 50 INJECTION, POWDER, LYOPHILIZED, FOR SOLUTION INTRAVENOUS at 20:47

## 2021-01-17 RX ADMIN — CARBIDOPA AND LEVODOPA 1 TABLET: 25; 100 TABLET ORAL at 20:47

## 2021-01-17 RX ADMIN — CETIRIZINE HYDROCHLORIDE 10 MG: 10 TABLET, FILM COATED ORAL at 08:01

## 2021-01-17 ASSESSMENT — PAIN SCALES - GENERAL
PAINLEVEL_OUTOF10: 0

## 2021-01-17 NOTE — PROGRESS NOTES
Cardiology Progress Note     Admit Date: 2021     Reason for follow up: STEMI    Interval History:   - Patient seen and examined. - Clinical notes reviewed. - Telemetry reviewed. No significant arrhythmias.  - No chest pain currently. - Denies shortness of breath, dyspnea on exertion, Orthopnea, PND at the time of this visit. Physical Examination:  Vitals:    21 0617   BP: 120/72   Pulse: 77   Resp: 16   Temp: 97.3 °F (36.3 °C)   SpO2: 96%        Intake/Output Summary (Last 24 hours) at 2021 0755  Last data filed at 2021 1600  Gross per 24 hour   Intake 480 ml   Output --   Net 480 ml     In: 480 [P.O.:480]  Out: -    Wt Readings from Last 3 Encounters:   21 215 lb 9.8 oz (97.8 kg)   01/14/15 190 lb (86.2 kg)     Temp  Av.3 °F (36.3 °C)  Min: 97 °F (36.1 °C)  Max: 97.8 °F (36.6 °C)  Pulse  Av.3  Min: 77  Max: 84  BP  Min: 113/65  Max: 120/72  SpO2  Av %  Min: 94 %  Max: 96 %    · Telemetry: Sinus rhythm   · Constitutional: Alert. Oriented to person, place, and time. No distress. · Head: Normocephalic and atraumatic. · Mouth/Throat: Lips appear moist. Oropharynx is clear and moist.  · Neck: Neck supple. No lymphadenopathy. No rigidity. No JVD present. · Cardiovascular: Normal rate, regular rhythm. Normal S1&S2. · Pulmonary/Chest: Bilateral respiratory sounds present. No respiratory accessory muscle use. · Abdominal: Soft. Normal bowel sounds present. No distension, No tenderness. No splenomegaly. No hernia. · Neurological: Alert and oriented. Cranial nerve II-XII grossly intact. · Skin: Skin is warm and dry. No rash, lesions, ulcerations noted. · Psychiatric: No anxiety nor agitation. Labs, diagnostic and imaging results reviewed. Reviewed.    Recent Labs     01/15/21  0300 21  0450 21  0630    140 138   K 3.9 4.1 4.0    107 107   CO2 21 21 21   BUN 18 18 17   CREATININE 0.7* 0.8 0.8     Recent Labs     01/15/21  4815 01/16/21  0450 01/17/21  0630   WBC 7.6 8.2 8.6   HGB 13.4* 13.7 13.8   HCT 40.4* 41.9 40.9   MCV 90.6 91.4 91.4    145 141     No results found for: CKTOTAL, CKMB, CKMBINDEX, TROPONINI  Estimated Creatinine Clearance: 108 mL/min (based on SCr of 0.8 mg/dL).    No results found for: BNP  Lab Results   Component Value Date    PROTIME 12.9 01/13/2021    INR 1.11 01/13/2021     Lab Results   Component Value Date    CHOL 145 01/13/2021    HDL 41 01/13/2021    TRIG 173 01/13/2021       Scheduled Meds:   lisinopril  2.5 mg Oral BID    cetirizine  10 mg Oral Daily    atorvastatin  80 mg Oral Daily    sodium chloride flush  10 mL Intravenous 2 times per day    metoprolol tartrate  12.5 mg Oral BID    aspirin  325 mg Oral Daily    carbidopa-levodopa  1 tablet Oral Nightly     Continuous Infusions:   nitroGLYCERIN 20 mcg/min (01/16/21 1958)    heparin (PORCINE) Infusion 10 Units/kg/hr (01/15/21 1653)    cangrelor [facility]) infusion 0.75 mcg/kg/min (01/16/21 2029)     PRN Meds:sodium chloride flush, acetaminophen, heparin (porcine), heparin (porcine), morphine **OR** morphine, potassium chloride **OR** potassium alternative oral replacement **OR** potassium chloride     Patient Active Problem List    Diagnosis Date Noted    COVID-19     Ex-smoker     Weight loss counseling, encounter for     Tobacco abuse counseling     Coronary artery disease due to lipid rich plaque     Class 2 obesity due to excess calories with body mass index (BMI) of 35.0 to 35.9 in adult     STEMI involving left anterior descending coronary artery ([facility] [address] 01/13/2021    ST elevation myocardial infarction (STEMI) [facility])       Active Hospital Problems    Diagnosis Date Noted    COVID-19 [U07.1]     Ex-smoker [Z87.891]     Weight loss counseling, encounter for [Z71.3]     Tobacco abuse counseling [Z71.6]     Coronary artery disease due to lipid rich plaque [I25.10, I25.83]     Class 2 obesity due to excess calories with body mass

## 2021-01-18 LAB
ANION GAP SERPL CALCULATED.3IONS-SCNC: 11 MMOL/L (ref 3–16)
APTT: 72.5 SEC (ref 24.2–36.2)
BUN BLDV-MCNC: 16 MG/DL (ref 7–20)
CALCIUM SERPL-MCNC: 9.1 MG/DL (ref 8.3–10.6)
CHLORIDE BLD-SCNC: 107 MMOL/L (ref 99–110)
CO2: 21 MMOL/L (ref 21–32)
CREAT SERPL-MCNC: 0.8 MG/DL (ref 0.8–1.3)
GFR AFRICAN AMERICAN: >60
GFR NON-AFRICAN AMERICAN: >60
GLUCOSE BLD-MCNC: 109 MG/DL (ref 70–99)
HCT VFR BLD CALC: 41.9 % (ref 40.5–52.5)
HEMOGLOBIN: 14 G/DL (ref 13.5–17.5)
MCH RBC QN AUTO: 30.6 PG (ref 26–34)
MCHC RBC AUTO-ENTMCNC: 33.4 G/DL (ref 31–36)
MCV RBC AUTO: 91.7 FL (ref 80–100)
PDW BLD-RTO: 13.2 % (ref 12.4–15.4)
PLATELET # BLD: 144 K/UL (ref 135–450)
PMV BLD AUTO: 9 FL (ref 5–10.5)
POTASSIUM SERPL-SCNC: 4 MMOL/L (ref 3.5–5.1)
RBC # BLD: 4.57 M/UL (ref 4.2–5.9)
SODIUM BLD-SCNC: 139 MMOL/L (ref 136–145)
WBC # BLD: 8.7 K/UL (ref 4–11)

## 2021-01-18 PROCEDURE — 99233 SBSQ HOSP IP/OBS HIGH 50: CPT | Performed by: INTERNAL MEDICINE

## 2021-01-18 PROCEDURE — 6370000000 HC RX 637 (ALT 250 FOR IP): Performed by: INTERNAL MEDICINE

## 2021-01-18 PROCEDURE — 6360000002 HC RX W HCPCS: Performed by: INTERNAL MEDICINE

## 2021-01-18 PROCEDURE — 85730 THROMBOPLASTIN TIME PARTIAL: CPT

## 2021-01-18 PROCEDURE — 80048 BASIC METABOLIC PNL TOTAL CA: CPT

## 2021-01-18 PROCEDURE — C9460 INJECTION, CANGRELOR: HCPCS | Performed by: INTERNAL MEDICINE

## 2021-01-18 PROCEDURE — 85027 COMPLETE CBC AUTOMATED: CPT

## 2021-01-18 PROCEDURE — 6370000000 HC RX 637 (ALT 250 FOR IP): Performed by: NURSE PRACTITIONER

## 2021-01-18 PROCEDURE — 2580000003 HC RX 258: Performed by: INTERNAL MEDICINE

## 2021-01-18 PROCEDURE — 2000000000 HC ICU R&B

## 2021-01-18 RX ORDER — ASPIRIN 81 MG/1
81 TABLET ORAL DAILY
Status: DISCONTINUED | OUTPATIENT
Start: 2021-01-18 | End: 2021-01-21 | Stop reason: HOSPADM

## 2021-01-18 RX ADMIN — ATORVASTATIN CALCIUM 80 MG: 80 TABLET, FILM COATED ORAL at 09:13

## 2021-01-18 RX ADMIN — CANGRELOR 0.75 MCG/KG/MIN: 50 INJECTION, POWDER, LYOPHILIZED, FOR SOLUTION INTRAVENOUS at 09:13

## 2021-01-18 RX ADMIN — LISINOPRIL 2.5 MG: 5 TABLET ORAL at 09:12

## 2021-01-18 RX ADMIN — LISINOPRIL 2.5 MG: 5 TABLET ORAL at 17:51

## 2021-01-18 RX ADMIN — ASPIRIN 325 MG ORAL TABLET 325 MG: 325 PILL ORAL at 09:14

## 2021-01-18 RX ADMIN — TICAGRELOR 90 MG: 90 TABLET ORAL at 16:08

## 2021-01-18 RX ADMIN — CETIRIZINE HYDROCHLORIDE 10 MG: 10 TABLET, FILM COATED ORAL at 09:12

## 2021-01-18 RX ADMIN — METOPROLOL TARTRATE 12.5 MG: 25 TABLET, FILM COATED ORAL at 21:27

## 2021-01-18 RX ADMIN — CARBIDOPA AND LEVODOPA 1 TABLET: 25; 100 TABLET ORAL at 21:27

## 2021-01-18 RX ADMIN — METOPROLOL TARTRATE 12.5 MG: 25 TABLET, FILM COATED ORAL at 09:12

## 2021-01-18 RX ADMIN — HEPARIN SODIUM 10 UNITS/KG/HR: 10000 INJECTION, SOLUTION INTRAVENOUS at 05:32

## 2021-01-18 ASSESSMENT — PAIN SCALES - GENERAL
PAINLEVEL_OUTOF10: 0
PAINLEVEL_OUTOF10: 0

## 2021-01-18 NOTE — PROGRESS NOTES
CC: 3V CAD s/p LAD PCI    No c/o. No CP  NSR  D/W Dr. Thomas Found and updated patient -- with COVID, favor PCI LAD and CX leaving option for CABG open in future if needed, could assess osteal LAD with FFR at time of intervention or in the future after COVID clears. All patient's questions answered. He is very amenable to this plan.

## 2021-01-18 NOTE — PROGRESS NOTES
Cardiovascular Progress Note      Chief Complaint:   ACS  Impression/Recommendations:    Mr. Mike Villarreal is a 61 y.o. male patient     Anterior STEMI S/P POBA mid LAD 1/13/21  MVCAD  Ischemic cardiomyopathy (LVEF 40-45%)  Obesity  Hypertension   Hyperlipidemia  CHELSEY  COVID19 Infection (+PCR 1/13/21)      POBA- mid LAD stent thrombosis/in stent restenosis with restoration of flow and minimal distal LAD disease beyond to apex   Continue Heparin and Nitroglycerin gtts   Continue Aspirin, statin, beta blocker, low dose ACEI   Appreciate CTS and Infectious disease input  Reviewed films and clinical course with Dr. Romana Hernandez again today. Will plan for [facility] 1/20/21. Switch Cangrelor gtt back to PO Ticagrelor. Risks, benefits, goals, and alternatives of left heart catheterization with the potential for percutaneous coronary intervention discussed with patient; including stroke, heart attack, kidney damage, death, paralysis, disability, damage to nerves/arteries/veins. All questions answered and informed consent obtained. Further recommendations pending coronary angiography and clinical course. Interval History:   Discussed updates/plan at bedside. No recurrent chest pain on low dose Nitro gtt. No LE edema, orthopnea, or oxygen requirements. ROS + for \"chronic back pain\". Remains asymptomatic with COVID.    Tele: SR 70s no events     1/13/21  Left Heart Cath  Dominance: Right       LM: 40% eccentric distal stenosis   LAD: 90% ostial to proximal stenosis; 90% mid in stent restenosis , jailed second diagonal with 30% ostial narrowing ; 100% mid stent thrombosis at distal edge   LCx: diffuse disease with 90% mid and distal stenoses, with severe disease extending into proximal OM2   RCA: 70% proximal and 85% mid stenoses; RPLB and RPDA free of significant disease      LVEDP: 17 mmHg   LVEF: 25-30%      6 Fr XB 3.5 Guide   BMW wire to apical LAD with ease  2.5 x 12 mm Trek balloon to culprit occlusion   Restoration of PAULETTE III flow to wrap around apex        Medications:      lisinopril (PRINIVIL;ZESTRIL) tablet 2.5 mg, BID      cetirizine (ZYRTEC) tablet 10 mg, Daily      atorvastatin (LIPITOR) tablet 80 mg, Daily      sodium chloride flush 0.9 % injection 10 mL, 2 times per day      sodium chloride flush 0.9 % injection 10 mL, PRN      acetaminophen (TYLENOL) tablet 650 mg, Q4H PRN      metoprolol tartrate (LOPRESSOR) tablet 12.5 mg, BID      nitroGLYCERIN 50 mg in dextrose 5% 250 mL infusion, Continuous      aspirin tablet 325 mg, Daily      heparin (porcine) injection 4,000 Units, PRN      heparin (porcine) injection 2,000 Units, PRN      heparin 25,000 units in dextrose 5% 250 mL infusion, Continuous      morphine (PF) injection 2 mg, Q2H PRN    Or      morphine injection 4 mg, Q2H PRN      cangrelor (KENGREAL) 50 mg in sodium chloride 0.9 % 250 mL infusion, Continuous      potassium chloride (KLOR-CON M) extended release tablet 40 mEq, PRN    Or      potassium bicarb-citric acid (EFFER-K) effervescent tablet 40 mEq, PRN    Or      potassium chloride 10 mEq/100 mL IVPB (Peripheral Line), PRN      carbidopa-levodopa (SINEMET)  MG per tablet 1 tablet, Nightly        I/O:     Intake/Output Summary (Last 24 hours) at 1/18/2021 0802  Last data filed at 1/18/2021 0532  Gross per 24 hour   Intake 558.54 ml   Output --   Net 558.54 ml       Physical Exam:    /74   Pulse 74   Temp 97.6 °F (36.4 °C) (Temporal)   Resp 18   Ht 5' 5.98\" (1.676 m)   Wt 215 lb 9.8 oz (97.8 kg)   SpO2 97%   BMI 34.82 kg/m²   Wt Readings from Last 3 Encounters:   01/18/21 215 lb 9.8 oz (97.8 kg)   01/14/15 190 lb (86.2 kg)     Due to the current efforts to prevent transmission of COVID-19 and also the need to preserve PPE for other caregivers, a face-to-face encounter with the patient was not performed. That being said, all relevant records and diagnostic tests were reviewed, including laboratory results and imaging. Please reference any relevant documentation elsewhere. Care will be coordinated with the primary service. Data Review:    CBC:   Recent Labs     01/16/21  0450 01/17/21  0630 01/18/21  0445   WBC 8.2 8.6 8.7   HGB 13.7 13.8 14.0   HCT 41.9 40.9 41.9   MCV 91.4 91.4 91.7    141 144     BMP:   Recent Labs     01/16/21 0450 01/17/21 0630 01/18/21  0445    138 139   K 4.1 4.0 4.0    107 107   CO2 21 21 21   BUN 18 17 16   CREATININE 0.8 0.8 0.8   GFRAA >60 >60 >60       Recent Labs     01/16/21 0450 01/17/21 0630 01/18/21  0445   APTT 55.6* 62.8* 72.5*     [facility] ([facility]  Interventional Cardiology     o: [phone]  [address] [facility], [address] [facility]      NOTE:  This report was transcribed using voice recognition software. Every effort was made to ensure accuracy; however, inadvertent computerized transcription errors may be present.

## 2021-01-18 NOTE — PROGRESS NOTES
Shift summary:    New plan to stent on Wednesday, no CABG at this time. Pt remains on Nitroglycerin, Heparin, and Cangrelor gtts. Started PO Brillinta, will stop cangrelor gtt when bag empty. No C/O CP or any any other pain this shift. Will continue to monitor.     Electronically signed by Shawn Aldana RN on 1/18/2021 at 6:17 PM

## 2021-01-19 LAB
ANION GAP SERPL CALCULATED.3IONS-SCNC: 11 MMOL/L (ref 3–16)
APTT: 72.4 SEC (ref 24.2–36.2)
BUN BLDV-MCNC: 17 MG/DL (ref 7–20)
CALCIUM SERPL-MCNC: 9.4 MG/DL (ref 8.3–10.6)
CHLORIDE BLD-SCNC: 105 MMOL/L (ref 99–110)
CO2: 21 MMOL/L (ref 21–32)
CREAT SERPL-MCNC: 0.9 MG/DL (ref 0.8–1.3)
GFR AFRICAN AMERICAN: >60
GFR NON-AFRICAN AMERICAN: >60
GLUCOSE BLD-MCNC: 113 MG/DL (ref 70–99)
HCT VFR BLD CALC: 43.4 % (ref 40.5–52.5)
HEMOGLOBIN: 14.5 G/DL (ref 13.5–17.5)
MCH RBC QN AUTO: 30.9 PG (ref 26–34)
MCHC RBC AUTO-ENTMCNC: 33.4 G/DL (ref 31–36)
MCV RBC AUTO: 92.4 FL (ref 80–100)
PDW BLD-RTO: 13.5 % (ref 12.4–15.4)
PLATELET # BLD: 156 K/UL (ref 135–450)
PMV BLD AUTO: 8.9 FL (ref 5–10.5)
POTASSIUM SERPL-SCNC: 4.1 MMOL/L (ref 3.5–5.1)
RBC # BLD: 4.7 M/UL (ref 4.2–5.9)
SODIUM BLD-SCNC: 137 MMOL/L (ref 136–145)
WBC # BLD: 9.2 K/UL (ref 4–11)

## 2021-01-19 PROCEDURE — 2580000003 HC RX 258: Performed by: INTERNAL MEDICINE

## 2021-01-19 PROCEDURE — 6370000000 HC RX 637 (ALT 250 FOR IP): Performed by: INTERNAL MEDICINE

## 2021-01-19 PROCEDURE — 80048 BASIC METABOLIC PNL TOTAL CA: CPT

## 2021-01-19 PROCEDURE — 85730 THROMBOPLASTIN TIME PARTIAL: CPT

## 2021-01-19 PROCEDURE — 6370000000 HC RX 637 (ALT 250 FOR IP): Performed by: NURSE PRACTITIONER

## 2021-01-19 PROCEDURE — 85027 COMPLETE CBC AUTOMATED: CPT

## 2021-01-19 PROCEDURE — 2000000000 HC ICU R&B

## 2021-01-19 PROCEDURE — 99233 SBSQ HOSP IP/OBS HIGH 50: CPT | Performed by: NURSE PRACTITIONER

## 2021-01-19 RX ADMIN — LISINOPRIL 2.5 MG: 5 TABLET ORAL at 21:25

## 2021-01-19 RX ADMIN — ATORVASTATIN CALCIUM 80 MG: 80 TABLET, FILM COATED ORAL at 08:56

## 2021-01-19 RX ADMIN — ASPIRIN 81 MG: 81 TABLET, FILM COATED ORAL at 08:56

## 2021-01-19 RX ADMIN — CARBIDOPA AND LEVODOPA 1 TABLET: 25; 100 TABLET ORAL at 21:25

## 2021-01-19 RX ADMIN — CETIRIZINE HYDROCHLORIDE 10 MG: 10 TABLET, FILM COATED ORAL at 08:57

## 2021-01-19 RX ADMIN — METOPROLOL TARTRATE 12.5 MG: 25 TABLET, FILM COATED ORAL at 08:58

## 2021-01-19 RX ADMIN — METOPROLOL TARTRATE 12.5 MG: 25 TABLET, FILM COATED ORAL at 21:25

## 2021-01-19 RX ADMIN — TICAGRELOR 90 MG: 90 TABLET ORAL at 08:56

## 2021-01-19 RX ADMIN — Medication 10 ML: at 08:57

## 2021-01-19 RX ADMIN — TICAGRELOR 90 MG: 90 TABLET ORAL at 21:25

## 2021-01-19 RX ADMIN — LISINOPRIL 2.5 MG: 5 TABLET ORAL at 08:56

## 2021-01-19 ASSESSMENT — PAIN SCALES - GENERAL
PAINLEVEL_OUTOF10: 0

## 2021-01-19 NOTE — PLAN OF CARE
Problem: Gas Exchange - Impaired  Goal: Absence of hypoxia  Outcome: Ongoing    Problem: Fatigue  Goal: Verbalize increase energy and improved vitality  Outcome: Ongoing     Problem: Airway Clearance - Ineffective  Goal: Achieve or maintain patent airway  Outcome: Ongoing       Electronically signed by Sangeetha Velarde RN on 1/19/2021 at 12:57 PM

## 2021-01-19 NOTE — PROGRESS NOTES
[address]   Cardiology Progress Note     Date: 1/19/2021  Admit Date: 1/13/2021     Reason for consultation: CP    Chief Complaint: No chief complaint on file. History of Present Illness: History obtained from patient and medical record. Jeff Rhodes is a 61 y.o. male with a past medical history of coronary artery disease and LAD stenting at outside hospital in 2015. He had 2 drug-eluting stents to his mid LAD at that time and reports a stable angiogram with patent stents and no new plaque disease later that year. Pt reported angina prior to arrival. ECG revealed anterior ST elevation. Interval Hx: Today, he is stable. Spoke to pt and nurse over the phone. He has no complaints today. Answered questions. Clinical notes reviewed. Telemetry reviewed. No new complaints today. No major events overnight. Denies having chest pain, palpitations, shortness of breath, orthopnea/PND, cough, or dizziness at the time of this visit. Past Medical History:  No past medical history on file. Past Surgical History:    has a past surgical history that includes Elbow surgery. Social History:  Reviewed. reports that he has quit smoking. He does not have any smokeless tobacco history on file. Allergies:  No Known Allergies    Family History:  Reviewed. family history is not on file. Denies family history of sudden cardiac death, arrhythmia, premature CAD    Home Meds:  Prior to Visit Medications    Medication Sig Taking?  Authorizing Provider   methocarbamol (ROBAXIN) 500 MG tablet   Historical Provider, MD   rOPINIRole (REQUIP) 0.5 MG tablet   Historical Provider, MD        Scheduled Meds:   ticagrelor  90 mg Oral BID    aspirin  81 mg Oral Daily    lisinopril  2.5 mg Oral BID    cetirizine  10 mg Oral Daily    atorvastatin  80 mg Oral Daily    sodium chloride flush  10 mL Intravenous 2 times per day    metoprolol tartrate  12.5 mg Oral BID    carbidopa-levodopa  1 tablet Oral Nightly Continuous Infusions:   nitroGLYCERIN 10 mcg/min (01/18/21 0440)    heparin (PORCINE) Infusion 10 Units/kg/hr (01/18/21 0532)     PRN Meds:sodium chloride flush, acetaminophen, heparin (porcine), heparin (porcine), morphine **OR** morphine, potassium chloride **OR** potassium alternative oral replacement **OR** potassium chloride     Review of Systems:  · Constitutional: Negative for fever, night sweats, chills, weight changes  · Skin: Negative for rash, pruritus, bleeding, blood clots, or bruising    · HEENT: Negative for vision changes, ringing in the ears, dysphagia, or swollen lymph nodes  · Respiratory: Reviewed in HPI  · Cardiovascular: Reviewed in HPI  · Gastrointestinal: Negative for abdominal pain, N/V/D, constipation, or black/tarry stools  · Genito-Urinary: Negative for dysuria, incontinence, or hematuria  · Musculoskeletal: Negative for joint swelling, muscle pain, or injuries  · Neurological/Psych: Negative for confusion, seizures, headaches, or TIA-like symptoms. No anxiety or depression. Physical Examination:  Vitals:    01/19/21 0000   BP:    Pulse: 66   Resp:    Temp:    SpO2:       In: 1057.3 [P.O.:720; I.V.:337.3]  Out: -    Wt Readings from Last 3 Encounters:   01/18/21 215 lb 9.8 oz (97.8 kg)   01/14/15 190 lb (86.2 kg)       Intake/Output Summary (Last 24 hours) at 1/19/2021 5857  Last data filed at 1/18/2021 2143  Gross per 24 hour   Intake 1297.28 ml   Output --   Net 1297.28 ml       Telemetry: Personally Reviewed  - Sinus rhythm     Due to the current efforts to prevent transmission of COVID-19 and also the need to preserve PPE for other caregivers, a face-to-face encounter with the patient was not performed. That being said, all relevant records and diagnostic tests were reviewed, including laboratory results and imaging. Please reference any relevant documentation elsewhere.  Care will be coordinated with the primary service      Pertinent labs, diagnostic, device, and imaging results reviewed as a part of this visit    Labs:    BMP:   Recent Labs     01/17/21  0630 01/18/21  0445 01/19/21  0459    139 137   K 4.0 4.0 4.1    107 105   CO2 21 21 21   BUN 17 16 17   CREATININE 0.8 0.8 0.9     Estimated Creatinine Clearance: 96 mL/min (based on SCr of 0.9 mg/dL). CBC:   Recent Labs     01/17/21  0630 01/18/21  0445 01/19/21  0459   WBC 8.6 8.7 9.2   HGB 13.8 14.0 14.5   HCT 40.9 41.9 43.4   MCV 91.4 91.7 92.4    144 156     Thyroid: No results found for: TSH, Y0ZOWNK, E6BZWCK, THYROIDAB  Lipids:   Lab Results   Component Value Date    CHOL 145 01/13/2021    HDL 41 01/13/2021    TRIG 173 01/13/2021     LFTS:   Lab Results   Component Value Date    ALT 61 01/13/2021    AST 45 01/13/2021    ALKPHOS 69 01/13/2021    PROT 6.9 01/13/2021    AGRATIO 1.3 01/13/2021    BILITOT 0.5 01/13/2021     Cardiac Enzymes: No results found for: CKTOTAL, CKMB, CKMBINDEX, TROPONINI  Coags:   Lab Results   Component Value Date    PROTIME 12.9 01/13/2021    INR 1.11 01/13/2021     ECHO:  1/14/21   Summary   Overall, left ventricular systolic function is mildly depressed. Ejection fraction is visually estimated to be 40-45%. Mild mitral regurgitation. The right ventricle is normal in size and function.     Left Heart Cath: 1/13/21  Dominance: Right       LM: 40% eccentric distal stenosis   LAD: 90% ostial to proximal stenosis; 90% mid in stent restenosis , jailed second diagonal with 30% ostial narrowing ; 100% mid stent thrombosis at distal edge   LCx: diffuse disease with 90% mid and distal stenoses, with severe disease extending into proximal OM2   RCA: 70% proximal and 85% mid stenoses; RPLB and RPDA free of significant disease      LVEDP: 17 mmHg   LVEF: 25-30%      6 Fr XB 3.5 Guide   BMW wire to apical LAD with ease  2.5 x 12 mm Trek balloon to culprit occlusion   Restoration of PAULETTE III flow to wrap around apex      Impression/Recommendations:  POBA- mid LAD stent thrombosis with restoration of flow and minimal distal LAD disease   Discussed with CT surgeon Dr. Madealine Dueñas in cath lab. Given complete resolution of chest pain and ECG segments, will transition Brilinta load to Cangrelor gtt and work up for CABG. Transfer to CVU on Cangrelor, Heparin, and Nitroglycerin gtts. Obtain echocardiogram.     CT chest: 1/14/21  Coronary artery calcification.  No aortic aneurysm.  Trace atherosclerosis   seen in the aortic arch       Small nodular density along the left major fissure, likely intrapulmonary   lymph node by location.       RECOMMENDATIONS:   Fleischner Society guidelines for follow-up and management of incidentally   detected pulmonary nodules:     Problem List:   Patient Active Problem List    Diagnosis Date Noted    COVID-19     Ex-smoker     Weight loss counseling, encounter for     Tobacco abuse counseling     Coronary artery disease due to lipid rich plaque     Class 2 obesity due to excess calories with body mass index (BMI) of 35.0 to 35.9 in adult     STEMI involving left anterior descending coronary artery ([facility] 01/13/2021    ST elevation myocardial infarction (STEMI) ([facility]         Assessment and Plan:     Coronary artery disease   ~ stable ; no c/o chest pain today  ~ Anterior STEMI s/p C 1/13: POBA of mid LAD stent thrombosis. Severe LCx and RCA disease. ~ CTVS consult for possible CABG but given pt being COVID positive, PCI is favored   ~ plan for [facility] 1/20/21, cangrelor has been changed back to Brilinta   ~ continue aspirin and statin. On heparin and nitro gtt. Ischemic cardiomyopathy   ~ stable   ~ echo 1/14: EF 40-45%  ~ on lisinopril 2.5 BID and lopressor 12.5 BID    COVID 19  ~ positive by rapid and PCR  ~ asymptomatic     Hypertension   ~ acceptable      Hyperlipidemia   ~ continue high intensity statin     CHELSEY     Multiple medical conditions with risk of decompensation.      All pertinent information and plan of care discussed with the physician. Thank you for allowing to us to participate in the care of [address] [place] [address].     Candy FREEMAN-CNP  [facility]   Office: [phone]

## 2021-01-20 LAB
ANION GAP SERPL CALCULATED.3IONS-SCNC: 12 MMOL/L (ref 3–16)
APTT: 62.1 SEC (ref 24.2–36.2)
APTT: 84.3 SEC (ref 24.2–36.2)
BUN BLDV-MCNC: 17 MG/DL (ref 7–20)
CALCIUM SERPL-MCNC: 9.4 MG/DL (ref 8.3–10.6)
CHLORIDE BLD-SCNC: 107 MMOL/L (ref 99–110)
CO2: 20 MMOL/L (ref 21–32)
CREAT SERPL-MCNC: 0.8 MG/DL (ref 0.8–1.3)
GFR AFRICAN AMERICAN: >60
GFR NON-AFRICAN AMERICAN: >60
GLUCOSE BLD-MCNC: 111 MG/DL (ref 70–99)
HCT VFR BLD CALC: 43.9 % (ref 40.5–52.5)
HEMOGLOBIN: 14.4 G/DL (ref 13.5–17.5)
MCH RBC QN AUTO: 30.2 PG (ref 26–34)
MCHC RBC AUTO-ENTMCNC: 32.9 G/DL (ref 31–36)
MCV RBC AUTO: 91.7 FL (ref 80–100)
PDW BLD-RTO: 13.3 % (ref 12.4–15.4)
PLATELET # BLD: 161 K/UL (ref 135–450)
PMV BLD AUTO: 9.3 FL (ref 5–10.5)
POC ACT LR: 167 SEC
POC ACT LR: 271 SEC
POC ACT LR: 280 SEC
POC ACT LR: 312 SEC
POC ACT LR: 371 SEC
POTASSIUM SERPL-SCNC: 4.1 MMOL/L (ref 3.5–5.1)
RBC # BLD: 4.79 M/UL (ref 4.2–5.9)
SODIUM BLD-SCNC: 139 MMOL/L (ref 136–145)
WBC # BLD: 8.2 K/UL (ref 4–11)

## 2021-01-20 PROCEDURE — 99152 MOD SED SAME PHYS/QHP 5/>YRS: CPT

## 2021-01-20 PROCEDURE — 6370000000 HC RX 637 (ALT 250 FOR IP): Performed by: NURSE PRACTITIONER

## 2021-01-20 PROCEDURE — 92928 PRQ TCAT PLMT NTRAC ST 1 LES: CPT | Performed by: INTERNAL MEDICINE

## 2021-01-20 PROCEDURE — C1874 STENT, COATED/COV W/DEL SYS: HCPCS

## 2021-01-20 PROCEDURE — 92978 ENDOLUMINL IVUS OCT C 1ST: CPT

## 2021-01-20 PROCEDURE — 85347 COAGULATION TIME ACTIVATED: CPT

## 2021-01-20 PROCEDURE — C1725 CATH, TRANSLUMIN NON-LASER: HCPCS

## 2021-01-20 PROCEDURE — 6360000002 HC RX W HCPCS

## 2021-01-20 PROCEDURE — 92978 ENDOLUMINL IVUS OCT C 1ST: CPT | Performed by: INTERNAL MEDICINE

## 2021-01-20 PROCEDURE — C1753 CATH, INTRAVAS ULTRASOUND: HCPCS

## 2021-01-20 PROCEDURE — 85027 COMPLETE CBC AUTOMATED: CPT

## 2021-01-20 PROCEDURE — 4A023N7 MEASUREMENT OF CARDIAC SAMPLING AND PRESSURE, LEFT HEART, PERCUTANEOUS APPROACH: ICD-10-PCS | Performed by: INTERNAL MEDICINE

## 2021-01-20 PROCEDURE — B241ZZ3 ULTRASONOGRAPHY OF MULTIPLE CORONARY ARTERIES, INTRAVASCULAR: ICD-10-PCS | Performed by: INTERNAL MEDICINE

## 2021-01-20 PROCEDURE — 6370000000 HC RX 637 (ALT 250 FOR IP): Performed by: INTERNAL MEDICINE

## 2021-01-20 PROCEDURE — 80048 BASIC METABOLIC PNL TOTAL CA: CPT

## 2021-01-20 PROCEDURE — B2151ZZ FLUOROSCOPY OF LEFT HEART USING LOW OSMOLAR CONTRAST: ICD-10-PCS | Performed by: INTERNAL MEDICINE

## 2021-01-20 PROCEDURE — 027137Z DILATION OF CORONARY ARTERY, TWO ARTERIES WITH FOUR OR MORE DRUG-ELUTING INTRALUMINAL DEVICES, PERCUTANEOUS APPROACH: ICD-10-PCS | Performed by: INTERNAL MEDICINE

## 2021-01-20 PROCEDURE — 2000000000 HC ICU R&B

## 2021-01-20 PROCEDURE — 85730 THROMBOPLASTIN TIME PARTIAL: CPT

## 2021-01-20 PROCEDURE — 99153 MOD SED SAME PHYS/QHP EA: CPT

## 2021-01-20 PROCEDURE — 99024 POSTOP FOLLOW-UP VISIT: CPT | Performed by: INTERNAL MEDICINE

## 2021-01-20 PROCEDURE — C1894 INTRO/SHEATH, NON-LASER: HCPCS

## 2021-01-20 PROCEDURE — 99152 MOD SED SAME PHYS/QHP 5/>YRS: CPT | Performed by: INTERNAL MEDICINE

## 2021-01-20 PROCEDURE — 2500000003 HC RX 250 WO HCPCS

## 2021-01-20 PROCEDURE — 2580000003 HC RX 258: Performed by: INTERNAL MEDICINE

## 2021-01-20 PROCEDURE — C1887 CATHETER, GUIDING: HCPCS

## 2021-01-20 PROCEDURE — 2709999900 HC NON-CHARGEABLE SUPPLY

## 2021-01-20 PROCEDURE — C1769 GUIDE WIRE: HCPCS

## 2021-01-20 PROCEDURE — B2101ZZ FLUOROSCOPY OF SINGLE CORONARY ARTERY USING LOW OSMOLAR CONTRAST: ICD-10-PCS | Performed by: INTERNAL MEDICINE

## 2021-01-20 PROCEDURE — 6360000004 HC RX CONTRAST MEDICATION: Performed by: INTERNAL MEDICINE

## 2021-01-20 PROCEDURE — 92928 PRQ TCAT PLMT NTRAC ST 1 LES: CPT

## 2021-01-20 RX ORDER — ACETAMINOPHEN 325 MG/1
650 TABLET ORAL EVERY 4 HOURS PRN
Status: DISCONTINUED | OUTPATIENT
Start: 2021-01-20 | End: 2021-01-21 | Stop reason: HOSPADM

## 2021-01-20 RX ORDER — SODIUM CHLORIDE 9 MG/ML
INJECTION, SOLUTION INTRAVENOUS CONTINUOUS
Status: DISCONTINUED | OUTPATIENT
Start: 2021-01-20 | End: 2021-01-21 | Stop reason: HOSPADM

## 2021-01-20 RX ORDER — SODIUM CHLORIDE 0.9 % (FLUSH) 0.9 %
10 SYRINGE (ML) INJECTION PRN
Status: DISCONTINUED | OUTPATIENT
Start: 2021-01-20 | End: 2021-01-21 | Stop reason: HOSPADM

## 2021-01-20 RX ORDER — SODIUM CHLORIDE 0.9 % (FLUSH) 0.9 %
10 SYRINGE (ML) INJECTION EVERY 12 HOURS SCHEDULED
Status: DISCONTINUED | OUTPATIENT
Start: 2021-01-20 | End: 2021-01-21 | Stop reason: HOSPADM

## 2021-01-20 RX ADMIN — LISINOPRIL 2.5 MG: 5 TABLET ORAL at 20:40

## 2021-01-20 RX ADMIN — TICAGRELOR 90 MG: 90 TABLET ORAL at 20:41

## 2021-01-20 RX ADMIN — METOPROLOL TARTRATE 12.5 MG: 25 TABLET, FILM COATED ORAL at 20:41

## 2021-01-20 RX ADMIN — IOPAMIDOL 300 ML: 755 INJECTION, SOLUTION INTRAVENOUS at 18:36

## 2021-01-20 RX ADMIN — LISINOPRIL 2.5 MG: 5 TABLET ORAL at 08:56

## 2021-01-20 RX ADMIN — CARBIDOPA AND LEVODOPA 1 TABLET: 25; 100 TABLET ORAL at 20:41

## 2021-01-20 RX ADMIN — ASPIRIN 81 MG: 81 TABLET, FILM COATED ORAL at 08:55

## 2021-01-20 RX ADMIN — TICAGRELOR 90 MG: 90 TABLET ORAL at 08:55

## 2021-01-20 RX ADMIN — METOPROLOL TARTRATE 12.5 MG: 25 TABLET, FILM COATED ORAL at 08:55

## 2021-01-20 RX ADMIN — CETIRIZINE HYDROCHLORIDE 10 MG: 10 TABLET, FILM COATED ORAL at 08:55

## 2021-01-20 RX ADMIN — Medication 10 ML: at 08:55

## 2021-01-20 RX ADMIN — SODIUM CHLORIDE: 9 INJECTION, SOLUTION INTRAVENOUS at 19:12

## 2021-01-20 RX ADMIN — ATORVASTATIN CALCIUM 80 MG: 80 TABLET, FILM COATED ORAL at 08:55

## 2021-01-20 ASSESSMENT — PAIN SCALES - GENERAL
PAINLEVEL_OUTOF10: 0

## 2021-01-20 NOTE — PRE SEDATION
Brief Pre-Op Note/Sedation Assessment      Rafi Bright  1960  [id number]      [id number]  8:02 AM    Planned Procedure: Cardiac Catheterization Procedure    Post Procedure Plan: Return to same level of care    Consent: I have discussed with the patient and/or the patient representative the indication, alternatives, and the possible risks and/or complications of the planned procedure and the anesthesia methods. The patient and/or patient representative appear to understand and agree to proceed. Chief Complaint: STEMI      Indications for Cath Procedure:  ACS > 24 hrs  Anginal Classification within 2 weeks:  CCS IV - Inability to perform any activity without angina or angina at rest, i.e., severe limitation  NYHA Heart Failure Class within 2 weeks: No symptoms  Is Cath Lab Visit Valve-related?: No  Surgical Risk: Intermediate  Functional Type: >= 4 METS with symptoms    Anti- Anginal Meds within 2 weeks:   Yes: Beta Blockers, Aspirin and Statin (Any)    Stress or Imaging Studies Performed:  None     Vital Signs:  /73   Pulse 69   Temp 98 °F (36.7 °C) (Temporal)   Resp 16   Ht 5' 5.98\" (1.676 m)   Wt 214 lb 11.7 oz (97.4 kg)   SpO2 97%   BMI 34.67 kg/m²     Allergies:  No Known Allergies    Past Medical History:  No past medical history on file.       Surgical History:  Past Surgical History:   Procedure Laterality Date    ELBOW SURGERY      rt elbow         Medications:  Current Facility-Administered Medications   Medication Dose Route Frequency Provider Last Rate Last Admin    ticagrelor (BRILINTA) tablet 90 mg  90 mg Oral BID Linda [place], DO   90 mg at 01/19/21 2125    aspirin EC tablet 81 mg  81 mg Oral Daily Linda [place], DO   81 mg at 01/19/21 0856    lisinopril (PRINIVIL;ZESTRIL) tablet 2.5 mg  2.5 mg Oral BID Jesse Burrell MD   2.5 mg at 01/19/21 2125    cetirizine (ZYRTEC) tablet 10 mg  10 mg Oral Daily LYDIA Hoang - CNP   10 mg at 01/19/21 6340  atorvastatin (LIPITOR) tablet 80 mg  80 mg Oral Daily Zebedee Cheers, DO   80 mg at 01/19/21 0856    sodium chloride flush 0.9 % injection 10 mL  10 mL Intravenous 2 times per day Zebedee Cheers, DO   10 mL at 01/19/21 0857    sodium chloride flush 0.9 % injection 10 mL  10 mL Intravenous PRN Zebedee Cheers, DO        acetaminophen (TYLENOL) tablet 650 mg  650 mg Oral Q4H PRN Zebedee Cheers, DO        metoprolol tartrate (LOPRESSOR) tablet 12.5 mg  12.5 mg Oral BID Zebedee Cheers, DO   12.5 mg at 01/19/21 2125    nitroGLYCERIN 50 mg in dextrose 5% 250 mL infusion  5 mcg/min Intravenous Continuous Zebedee Cheers, DO 3 mL/hr at 01/18/21 0440 10 mcg/min at 01/18/21 0440    heparin (porcine) injection 4,000 Units  4,000 Units Intravenous PRN Zebedee Cheers, DO        heparin (porcine) injection 2,000 Units  2,000 Units Intravenous PRN Zebedee Cheers, DO        heparin 25,000 units in dextrose 5% 250 mL infusion  12 Units/kg/hr Intravenous Continuous Zebedee Cheers, DO 8 mL/hr at 01/20/21 0528 8 Units/kg/hr at 01/20/21 0528    morphine (PF) injection 2 mg  2 mg Intravenous Q2H PRN Zebedee Cheers, DO        Or    morphine injection 4 mg  4 mg Intravenous Q2H PRN Zebedee Cheers, DO        potassium chloride (KLOR-CON M) extended release tablet 40 mEq  40 mEq Oral PRN Zebedee Cheers, DO        Or    potassium bicarb-citric acid (EFFER-K) effervescent tablet 40 mEq  40 mEq Oral PRN Zebedee Cheers, DO        Or    potassium chloride 10 mEq/100 mL IVPB (Peripheral Line)  10 mEq Intravenous PRN Zebedee Cheers, DO        carbidopa-levodopa (SINEMET)  MG per tablet 1 tablet  1 tablet Oral Nightly Zebedee Cheers, DO   1 tablet at 01/19/21 2125           Pre-Sedation:    Pre-Sedation Documentation and Exam:  I have assessed the patient and agree with the H&P present on the chart.     Prior History of Anesthesia Complications:   none    Modified Mallampati:  III (soft palate, base of uvula visible)    ASA Classification:  Class 3 - A patient with severe systemic disease that limits activity but is not incapacitating      Gabino Scale: Activity:  2 - Able to move 4 extremities voluntarily on command  Respiration:  2 - Able to breathe deeply and cough freely  Circulation:  2 - BP+/- 20mmHg of normal  Consciousness:  2 - Fully awake  Oxygen Saturation (color):  2 - Able to maintain oxygen saturation >92% on room air    Sedation/Anesthesia Plan:  Guard the patient's safety and welfare. Minimize physical discomfort and pain. Minimize negative psychological responses to treatment by providing sedation and analgesia and maximize the potential amnesia. Patient to meet pre-procedure discharge plan. Medication Planned:  midazolam intravenously and fentanyl intravenously    Patient is an appropriate candidate for plan of sedation: yes    The risks, benefits, goals, and alternatives of the procedure were discussed in detail with the patient. Informed consent was obtained and further recommendations will be made following the procedure. Siddhartha Feliciano DO, [facility]  Interventional Cardiology     o: [phone]  [address] [place], [place] [address]olia [facility]., [address], [address] [facility]      NOTE:  This report was transcribed using voice recognition software. Every effort was made to ensure accuracy; however, inadvertent computerized transcription errors may be present.

## 2021-01-20 NOTE — PROGRESS NOTES
Cardiovascular Progress Note      Chief Complaint:   ACS  Impression/Recommendations:    Mr. Jeannie Mir is a 61 y.o. male patient     Anterior STEMI S/P POBA mid LAD 1/13/21  MVCAD  Ischemic cardiomyopathy (LVEF 40-45%)  Obesity  Hypertension   Hyperlipidemia  CHELSEY  COVID19 Infection (+PCR 1/13/21)      POBA- mid LAD stent thrombosis/in stent restenosis  1/13/21  Multivessel disease with surgical turndown in the setting of COVID19. S/P Successful PCI proximal to distal LAD, distal LM, mid to distal LCX 1/20/21  Continue ASA/Brilinta for at least 12 months. Continue  statin, beta blocker, low dose ACEI   Will discuss DC planning tomorrow. Interval History:    No recurrent chest pain on low dose Nitro gtt. No LE edema, orthopnea, or oxygen requirements. Remains asymptomatic with COVID.      Medications:      ticagrelor (BRILINTA) tablet 90 mg, BID      aspirin EC tablet 81 mg, Daily      lisinopril (PRINIVIL;ZESTRIL) tablet 2.5 mg, BID      cetirizine (ZYRTEC) tablet 10 mg, Daily      atorvastatin (LIPITOR) tablet 80 mg, Daily      sodium chloride flush 0.9 % injection 10 mL, 2 times per day      sodium chloride flush 0.9 % injection 10 mL, PRN      acetaminophen (TYLENOL) tablet 650 mg, Q4H PRN      metoprolol tartrate (LOPRESSOR) tablet 12.5 mg, BID      nitroGLYCERIN 50 mg in dextrose 5% 250 mL infusion, Continuous      heparin (porcine) injection 4,000 Units, PRN      heparin (porcine) injection 2,000 Units, PRN      heparin 25,000 units in dextrose 5% 250 mL infusion, Continuous      morphine (PF) injection 2 mg, Q2H PRN    Or      morphine injection 4 mg, Q2H PRN      potassium chloride (KLOR-CON M) extended release tablet 40 mEq, PRN    Or      potassium bicarb-citric acid (EFFER-K) effervescent tablet 40 mEq, PRN    Or      potassium chloride 10 mEq/100 mL IVPB (Peripheral Line), PRN      carbidopa-levodopa (SINEMET)  MG per tablet 1 tablet, Nightly        I/O:

## 2021-01-21 VITALS
RESPIRATION RATE: 18 BRPM | WEIGHT: 214.73 LBS | DIASTOLIC BLOOD PRESSURE: 67 MMHG | OXYGEN SATURATION: 99 % | TEMPERATURE: 98.3 F | HEART RATE: 78 BPM | BODY MASS INDEX: 34.51 KG/M2 | SYSTOLIC BLOOD PRESSURE: 109 MMHG | HEIGHT: 66 IN

## 2021-01-21 LAB
ANION GAP SERPL CALCULATED.3IONS-SCNC: 14 MMOL/L (ref 3–16)
BUN BLDV-MCNC: 16 MG/DL (ref 7–20)
CALCIUM SERPL-MCNC: 9.6 MG/DL (ref 8.3–10.6)
CHLORIDE BLD-SCNC: 104 MMOL/L (ref 99–110)
CO2: 21 MMOL/L (ref 21–32)
CREAT SERPL-MCNC: 0.9 MG/DL (ref 0.8–1.3)
GFR AFRICAN AMERICAN: >60
GFR NON-AFRICAN AMERICAN: >60
GLUCOSE BLD-MCNC: 102 MG/DL (ref 70–99)
HCT VFR BLD CALC: 43.3 % (ref 40.5–52.5)
HEMOGLOBIN: 14.5 G/DL (ref 13.5–17.5)
MCH RBC QN AUTO: 30.5 PG (ref 26–34)
MCHC RBC AUTO-ENTMCNC: 33.5 G/DL (ref 31–36)
MCV RBC AUTO: 91 FL (ref 80–100)
PDW BLD-RTO: 13.4 % (ref 12.4–15.4)
PLATELET # BLD: 165 K/UL (ref 135–450)
PMV BLD AUTO: 9.2 FL (ref 5–10.5)
POC ACT LR: >400 SEC
POC ACT LR: >400 SEC
POTASSIUM SERPL-SCNC: 4.2 MMOL/L (ref 3.5–5.1)
RBC # BLD: 4.76 M/UL (ref 4.2–5.9)
SODIUM BLD-SCNC: 139 MMOL/L (ref 136–145)
WBC # BLD: 9 K/UL (ref 4–11)

## 2021-01-21 PROCEDURE — 94760 N-INVAS EAR/PLS OXIMETRY 1: CPT

## 2021-01-21 PROCEDURE — 80048 BASIC METABOLIC PNL TOTAL CA: CPT

## 2021-01-21 PROCEDURE — 6370000000 HC RX 637 (ALT 250 FOR IP): Performed by: INTERNAL MEDICINE

## 2021-01-21 PROCEDURE — 85027 COMPLETE CBC AUTOMATED: CPT

## 2021-01-21 PROCEDURE — 2580000003 HC RX 258: Performed by: INTERNAL MEDICINE

## 2021-01-21 PROCEDURE — 6370000000 HC RX 637 (ALT 250 FOR IP): Performed by: NURSE PRACTITIONER

## 2021-01-21 PROCEDURE — 99238 HOSP IP/OBS DSCHRG MGMT 30/<: CPT | Performed by: INTERNAL MEDICINE

## 2021-01-21 RX ORDER — ATORVASTATIN CALCIUM 80 MG/1
80 TABLET, FILM COATED ORAL DAILY
Qty: 30 TABLET | Refills: 3 | Status: SHIPPED | OUTPATIENT
Start: 2021-01-22 | End: 2021-02-03 | Stop reason: SDUPTHER

## 2021-01-21 RX ORDER — LISINOPRIL 2.5 MG/1
2.5 TABLET ORAL 2 TIMES DAILY
Qty: 30 TABLET | Refills: 3 | Status: SHIPPED | OUTPATIENT
Start: 2021-01-21 | End: 2021-02-03 | Stop reason: SDUPTHER

## 2021-01-21 RX ORDER — ASPIRIN 81 MG/1
81 TABLET ORAL DAILY
Qty: 30 TABLET | Refills: 3 | Status: SHIPPED | OUTPATIENT
Start: 2021-01-22

## 2021-01-21 RX ADMIN — LISINOPRIL 2.5 MG: 5 TABLET ORAL at 08:36

## 2021-01-21 RX ADMIN — ASPIRIN 81 MG: 81 TABLET, FILM COATED ORAL at 08:35

## 2021-01-21 RX ADMIN — TICAGRELOR 90 MG: 90 TABLET ORAL at 08:36

## 2021-01-21 RX ADMIN — CETIRIZINE HYDROCHLORIDE 10 MG: 10 TABLET, FILM COATED ORAL at 08:36

## 2021-01-21 RX ADMIN — Medication 10 ML: at 08:38

## 2021-01-21 RX ADMIN — ATORVASTATIN CALCIUM 80 MG: 80 TABLET, FILM COATED ORAL at 08:36

## 2021-01-21 RX ADMIN — METOPROLOL TARTRATE 12.5 MG: 25 TABLET, FILM COATED ORAL at 08:36

## 2021-01-21 ASSESSMENT — PAIN SCALES - GENERAL
PAINLEVEL_OUTOF10: 0

## 2021-01-21 NOTE — FLOWSHEET NOTE
Pt a/o, VSS, independent in room. Right wrist cath site is free from bleeding, swelling or redness. Old drainage on tegaderm noted. Pt has no c/o pain or SOB.  Electronically signed by Adelaide Gonzalez RN on 1/21/2021 at 12:20 PM

## 2021-01-21 NOTE — DISCHARGE SUMMARY
Physician Discharge Summary    Patient ID:  Marilee Beckham  [id number]  49 y.o.  1960    Admit date: 1/13/2021  Discharge date and time: 1/21/2021    Admitting Physician: Rusty Morgan DO     Admission Diagnoses: STEMI involving left anterior descending coronary artery [facility]) [I21.02]  Discharge Diagnoses:    Patient Active Problem List   Diagnosis Code    ST elevation myocardial infarction (STEMI) ([facility] [address] I21.3    STEMI involving left anterior descending coronary artery ([facility] [address] I21.02    COVID-19 U07.1    Ex-smoker Z87.891    Weight loss counseling, encounter for Z71.3    Tobacco abuse counseling Z71.6    Coronary artery disease due to lipid rich plaque I25.10, I25.83    Class 2 obesity due to excess calories with body mass index (BMI) of 35.0 to 35.9 in adult E66.09, Z68.35       Consults: [address] Course:     Anterior STEMI    MVCAD  Ischemic cardiomyopathy (LVEF 40-45%)  Obesity  Hypertension   Hyperlipidemia  CHELSEY  COVID19 Infection (+PCR 1/13/21)        POBA- mid LAD stent thrombosis/in stent restenosis  1/13/21  Multivessel disease with surgical turndown in the setting of COVID19. S/P Successful PCI proximal to distal LAD, distal LM, mid to distal LCX 1/20/21  Continue ASA/Brilinta for at least 12 months.    Continue  statin, beta blocker, low dose ACEI     Discharge Exam:  /67   Pulse 78   Temp 98.3 °F (36.8 °C) (Temporal)   Resp 18   Ht 5' 5.98\" (1.676 m)   Wt 214 lb 11.7 oz (97.4 kg)   SpO2 99%   BMI 34.67 kg/m²   Wt Readings from Last 3 Encounters:   01/20/21 214 lb 11.7 oz (97.4 kg)   01/14/15 190 lb (86.2 kg)       · GENERAL: Well developed, well nourished, no acute distress  · NEUROLOGICAL: Alert and oriented x3  · PSYCH: Normal mood and affect   · SKIN: Warm and dry  · HEENT: Normocephalic, atraumatic, Sclera non-icteric, mucous membranes moist  · NECK: supple, JVP normal  · CAROTID: Normal upstroke, no bruits  · CARDIAC: Normal PMI, regular rate and rhythm, normal S1S2, no murmur, rub, or gallop  · RESPIRATORY: Normal respiratory effort, clear to auscultation bilaterally  · EXTREMITIES: No edema, +2 pulses bilaterally   · MUSCULOSKELETAL: No joint swelling or tenderness, no chest wall tenderness  · GASTROINTESTINAL: normal bowel sounds, soft, non-tender, no bruit    Disposition: Home    Patient Instructions:   Current Discharge Medication List      START taking these medications    Details   metoprolol tartrate (LOPRESSOR) 25 MG tablet Take 1 tablet by mouth 2 times daily  Qty: 60 tablet, Refills: 3      atorvastatin (LIPITOR) 80 MG tablet Take 1 tablet by mouth daily  Qty: 30 tablet, Refills: 3      lisinopril (PRINIVIL;ZESTRIL) 2.5 MG tablet Take 1 tablet by mouth 2 times daily  Qty: 30 tablet, Refills: 3      ticagrelor (BRILINTA) 90 MG TABS tablet Take 1 tablet by mouth 2 times daily  Qty: 60 tablet, Refills: 3      aspirin 81 MG EC tablet Take 1 tablet by mouth daily  Qty: 30 tablet, Refills: 3         CONTINUE these medications which have NOT CHANGED    Details   methocarbamol (ROBAXIN) 500 MG tablet       rOPINIRole (REQUIP) 0.5 MG tablet              Condition on Discharge: Good  Activity: as tolerated  Diet: Cardiac     Follow-up within 1-2 weeks with the [facility] [place], operated by [facility] as well as PCP. Paul Sac[facility]  Interventional Cardiology     o: [phone]  [address] [place] [address], [address] [facility], [facility]      NOTE:  This report was transcribed using voice recognition software. Every effort was made to ensure accuracy; however, inadvertent computerized transcription errors may be present.

## 2021-01-21 NOTE — PROGRESS NOTES
CLINICAL PHARMACY NOTE: MEDS TO [address] Select Patient?: No  Total # of Prescriptions Filled: 5   The following medications were delivered to the patient:  · Atorvastatin  · Metoprolol  · Brilinta  · Aspirin  · Lisinopril  Total # of Interventions Completed: 0  Time Spent (min): 60    Additional Documentation:  Delivered to nurses station    [facility]

## 2021-01-21 NOTE — ADT AUTH CERT
Myocardial Infarction - Care Day 7 (1/19/2021) by Felicia Wiseman RN       Review Status Review Entered   Completed 1/21/2021 08:29      Criteria Review      Care Day: 7 Care Date: 1/19/2021 Level of Care: ICU    Guideline Day 2    Level Of Care    (X) Intermediate care or telemetry    1/21/2021 8:28 AM EST by Salima Wiggins      ICU    Clinical Status    (X) * Hemodynamic stability    (X) * Chest pain, dyspnea, or anginal equivalent absent    Activity    ( ) Activity as tolerated    1/21/2021 8:28 AM EST by Salima Wiggins      Strict Bedrest    Routes    (X) * Oral hydration, medications    (X) Parenteral medications    (X) Heart-healthy diet as tolerated    1/21/2021 8:28 AM EST by Salima Wiggins      Cardiac diet    Interventions    (X) * Oxygen absent    (X) PT/PTT and platelet count    3/81/2703 8:28 AM EST by Salima Wiggins      1/19/2021 04:59  Platelet Count: 741      1/19/2021 04:59  aPTT: 72.4 (H)    Medications    ( ) * IV nitroglycerin absent    1/21/2021 8:28 AM EST by Salima Wiggins      [id number] mcg/min    (X) Anticoagulants    1/21/2021 8:28 AM EST by Salima Wiggins      heparin (PORCINE) [id number] Units/kg/hr    (X) Antiplatelet agents (eg, aspirin, clopidogrel, ticagrelor)    1/21/2021 8:28 AM EST by Salima Wiggins      ticagrelor 90 mgOralBID  ·aspirin 81 mgOralDaily    (X) Beta-blocker    1/21/2021 8:28 AM EST by Salima Wiggins      metoprolol tartrate 12.5 mgOralBID    (X) Possible ACE inhibitor or ARB    1/21/2021 8:28 AM EST by Salima Wiggins      isinopril 2.5 mgOralBID    (X) Statin    1/21/2021 8:28 AM EST by Salima Wiggins      atorvastatin 80 mgOralDaily    * Milestone   Additional Notes   1/19      Cardio   Today, he is stable. Spoke to pt and nurse over the phone. He has no complaints today. Answered questions. Clinical notes reviewed. Telemetry reviewed. No new complaints today. No major events overnight.     Denies having chest pain, palpitations,    Criteria Review      Care Day: 5 Care Date: 1/17/2021 Level of Care: ICU    Guideline Day 2    Level Of Care    (X) Intermediate care or telemetry    1/20/2021 12:07 PM EST by Carollee Curling      ICU    Clinical Status    (X) * Hemodynamic stability    (X) * Chest pain, dyspnea, or anginal equivalent absent    Activity    ( ) Activity as tolerated    1/20/2021 12:07 PM EST by Carollee Curling      Strict bedrest    Routes    (X) * Oral hydration, medications    (X) Parenteral medications    1/20/2021 12:07 PM EST by Carollee Curling      [id number] mcg/min   heparin (PORCINE) [id number] Units/kg/hr   [facility]) infusion0.75 mcg/kg/min    (X) Heart-healthy diet as tolerated    1/20/2021 12:07 PM EST by Carollee Curling      Cardiac    Interventions    (X) * Oxygen absent    (X) PT/PTT and platelet count    9/73/0949 12:07 PM EST by Carollee Curling      1/17/2021 06:30  aPTT: 62.8 (H)    Medications    ( ) * IV nitroglycerin absent    1/20/2021 12:07 PM EST by Carollee Curling      [id number] mcg/min    (X) Anticoagulants    1/20/2021 12:07 PM EST by Carollee Curling      heparin (PORCINE) [id number] Units/kg/hr    (X) Antiplatelet agents (eg, aspirin, clopidogrel, ticagrelor)    1/20/2021 12:07 PM EST by Carollee Curling      [facility]) infusion0.75 mcg/kg/min    (X) Beta-blocker    1/20/2021 12:07 PM EST by Carollee Curling      metoprolol tartrate 12.5 mgOralBID    (X) Possible ACE inhibitor or ARB    1/20/2021 12:07 PM EST by Carollee Curling      lisinopril 2.5 mgOralBID    (X) Statin    1/20/2021 12:07 PM EST by Carollee Curling      atorvastatin 80 mgOralDaily    * Milestone   Additional Notes   1/17      EP   Interval History:    - Patient seen and examined. - Clinical notes reviewed. - Telemetry reviewed.  No significant arrhythmias.   - No chest pain currently.    - Denies shortness of breath, dyspnea on exertion, Orthopnea, PND at the time of this visit.        21 0617   BP: 120/72   Pulse: 77   Resp: 16   Temp: 97.3 °F (36.3 °C)   SpO2: 96%      Temp  Av.3 °F (36.3 °C)  Min: 97 °F (36.1 °C)  Max: 97.8 °F (36.6 °C)   Pulse  Av.3  Min: 77  Max: 84   BP  Min: 113/65  Max: 120/72   SpO2  Av %  Min: 94 %  Max: 96 %       · Telemetry: Sinus rhythm    · Constitutional: Alert. Oriented to person, place, and time. No distress. · Head: Normocephalic and atraumatic. · Mouth/Throat: Lips appear moist. Oropharynx is clear and moist.   · Neck: Neck supple. No lymphadenopathy. No rigidity. No JVD present. · Cardiovascular: Normal rate, regular rhythm. Normal S1&S2. · Pulmonary/Chest: Bilateral respiratory sounds present. No respiratory accessory muscle use. · Abdominal: Soft. Normal bowel sounds present. No distension, No tenderness. No splenomegaly. No hernia. · Neurological: Alert and oriented. Cranial nerve II-XII grossly intact. · Skin: Skin is warm and dry. No rash, lesions, ulcerations noted. · Psychiatric: No anxiety nor agitation. Assessment and Plan:    1. Ischemic cardiomyopathy/STEMI. Patient is a pleasant 61year old male with a medical history significant for ischemic cardiomyopathy status post PCI to mid LAD, hypertension, hyperlipidemia, and obesity who presented from home with STEMI. [facility] underwent LHC on 2021 which showed stent thrombosis, severe LCx disease, and severe RCA disease. [facility] was referred to CTS for revascularization and is currently awaiting revascularization planning.  This has been complicated by his diagnosis of COVID19.       2021   Patient doing well.  No chest pain. - Continue high dose statin. - Continue aspirin.   - Continue lisinopril 2.5 mg BID.   - Continue metoprolol tartrate 12.5 mg BID.   - Continue cangrelor.   - Continue heparin drip.    - Continue nitro drip.  Will hold off on increasing antihypertensives in case need some blood pressure if we need to increase his nitro drip as he has exertional chest pain. - If chest pain worsening we will consider LHC.     - We will continue to follow along with you.       2. COVID19. No respiratory distress.  Not on oxygen. - Per primary team.       3. Hypertension. Stable and controlled.    - Plan as per above.   ------------------------   Scheduled Medications   · lisinopril 2.5 mg Oral BID   · cetirizine 10 mg Oral Daily   · atorvastatin 80 mg Oral Daily   · sodium chloride flush 10 mL Intravenous 2 times per day   · metoprolol tartrate 12.5 mg Oral BID   · aspirin 325 mg Oral Daily   · carbidopa-levodopa 1 tablet Oral Nightly          Continuous Infusions:   · nitroGLYCERIN 20 mcg/min    · heparin (PORCINE) Infusion 10 Units/kg/hr    · cangrelor (KENGREAL) infusion 0.75 mcg/kg/min              Myocardial Infarction - Care Day 3 (1/15/2021) by Shukri Scott RN       Review Status Review Entered   Completed 1/20/2021 12:04      Criteria Review      Care Day: 3 Care Date: 1/15/2021 Level of Care: ICU    Guideline Day 2    Level Of Care    (X) Intermediate care or telemetry    1/20/2021 12:04 PM EST by Carollee Curling      ICU    Clinical Status    (X) * Hemodynamic stability    (X) * Chest pain, dyspnea, or anginal equivalent absent    Routes    (X) * Oral hydration, medications    (X) Parenteral medications    (X) Heart-healthy diet as tolerated    1/20/2021 12:04 PM EST by Carollee Curling      Cardiac    Interventions    (X) * Oxygen absent    (X) PT/PTT and platelet count    5/41/1498 12:04 PM EST by Carollee Curling      1/15/2021 03:00  aPTT: 62.0 (H)    (X) Possible noninvasive diagnostic testing (eg, stress test) [L]    Medications    ( ) * IV nitroglycerin absent    1/20/2021 12:04 PM EST by Carollee Curling      [id number] mcg/min    (X) Anticoagulants    1/20/2021 12:04 PM EST by Carollee Curling      heparin (PORCINE) Infusion    (X) Antiplatelet agents (eg, aspirin, clopidogrel, ticagrelor)    1/20/2021 12:04 PM EST by Rachel Jeffery      aspirin 325 mgOralDaily  [id number] mcg/min     cangrelor [facility]) infusion0.75 mcg/kg/min    (X) Beta-blocker    1/20/2021 12:04 PM EST by Dieter Knott      metoprolol tartrate 12.5 mgOralBID    (X) Possible ACE inhibitor or ARB    1/20/2021 12:04 PM EST by Dieter Knott      lisinopril 2.5 mgOralBID    (X) Statin    1/20/2021 12:04 PM EST by Dieter Knott      atorvastatin 80 mgOralDaily    * Milestone   Additional Notes   1/15      CV Surg      CC: 3V CAD, s/p NSTEMI, COVID+       Stable overnight   With COVID best option likely PCI or medicine until recovers   Will discuss with Dr. Du Iqbal   ----------------   Cardio   CC:   CAD, Anterior MI, LV dysfunction, COVID positive      BP (!) 140/77   Pulse 66   Temp 97.4 °F (36.3 °C) (Temporal)   Resp 16   Ht 5' 5.98\" (1.676 m)   Wt 220 lb 10.9 oz (100.1 kg)   SpO2 99%   BMI 35.64 kg/m²        Exam deferred per COVID protocol       Assessment:     Anterior MI:   POBA of prior LAD stent. Anterior apical HK. Appears improved some on ECHO   Remains on heparin and Cangrelor.       CAD:   Severe CAD with complex LAD and LCX disease. Discussed with Dr. Du Iqbal, and reviewed Images with Dr.s Anabelle Overton and Kasey.-No viable percutaneous option. Needs CABG   CTVS seeing pt- Obvious issue is COVID positivity       LV Dysfunction:   Mild-moderate   On lisinopril and lopressor   BP slightly high   Increase lisinopril to 2.5 BID       COVID:   Rapid and PCR positive.    No symptoms   This presents obvious issues for surgical timing.   ---------------------------      1/15/2021 03:00   Sodium: 138   Potassium: 3.9   Chloride: 105   CO2: 21   BUN: 18   Creatinine: 0.7 (L)   Anion Gap: 12   GFR Non-: >60   GFR African American: >60   Glucose: 111 (H)   Calcium: 8.8   WBC: 7.6   RBC: 4.46   Hemoglobin Quant: 13.4 (L)   Hematocrit: 40.4 (L)   MCV: 90.6   MCH: 30.1   MCHC: 33.2   MPV: 9.5   RDW: 13.4   Platelet Count: 621

## 2021-01-22 ENCOUNTER — CARE COORDINATION (OUTPATIENT)
Dept: CASE MANAGEMENT | Age: 61
End: 2021-01-22

## 2021-01-25 ENCOUNTER — CARE COORDINATION (OUTPATIENT)
Dept: CASE MANAGEMENT | Age: 61
End: 2021-01-25

## 2021-01-25 RX ORDER — LANSOPRAZOLE 15 MG/1
15 CAPSULE, DELAYED RELEASE ORAL DAILY
COMMUNITY

## 2021-01-25 NOTE — CARE COORDINATION
Anna [facility] Transitions Initial Follow Up Call:  Patient reports that he is doing well, denies any chest pain, SOB, cough, fever. Discussed discharge instructions and reviewed medications, patient has all of his new medications and is taking them as prescribed. He will follow up with Dr. Carola Spencer 21 and Dr. Essie Wise in two weeks, providing that insurance will cover. CTN provided patient with billing phone number [phone] and encouraged him to contact his insurance company regarding coverage for out-of-network providers. CTN will continue with outreach follow up calls. Call within 2 business days of discharge: Yes    Patient: Jeff Rhodes Patient : 1960   MRN: [id number]  Reason for Admission: STEMI;s/p stent;COVID +;  Discharge Date: 21 RARS: Readmission Risk Score: 9      Last Discharge [facility]       Complaint Diagnosis Description Type Department Provider    21 STEMI ST elevation myocardial infarction (STEMI), unspecified artery [facility]) ED [facility], ; [facility] ... Spoke with: Jeff Rhodes      Challenges to be reviewed by the provider   Additional needs identified to be addressed with provider No  none    Discussed COVID-19 related testing which was available at this time. Test results were positive. Patient informed of results, if available? Yes         Method of communication with provider : none    Advance Care Planning:   Does patient have an Advance Directive:  not on file. Was this a readmission? No  Patient stated reason for admission: MI, cardiac cath with stents  Patients top risk factors for readmission: medical condition    Care Transition Nurse (CTN) contacted the patient by telephone to perform post hospital discharge assessment. Verified name and  with patient as identifiers. Provided introduction to self, and explanation of the CTN role.      CTN reviewed discharge instructions, medical action plan and red flags with patient who verbalized understanding. Patient given an opportunity to ask questions and does not have any further questions or concerns at this time. Were discharge instructions available to patient? Yes. Reviewed appropriate site of care based on symptoms and resources available to patient including: PCP, Urgent care clinics and When to call 911. The patient agrees to contact the PCP office for questions related to their healthcare. Medication reconciliation was performed with patient, who verbalizes understanding of administration of home medications. Advised obtaining a 90-day supply of all daily and as-needed medications. Covid Risk Education    Patient has following risk factors of: no known risk factors. Education provided regarding infection prevention, and signs and symptoms of COVID-19 and when to seek medical attention with patient who verbalized understanding. Discussed exposure protocols and quarantine From CDC: Are you at higher risk for severe illness?   and given an opportunity for questions and concerns. The patient agrees to contact the COVID-19 hotline [phone] or PCP office for questions related to COVID-19. For more information on steps you can take to protect yourself, see CDC's How to Protect Yourself     Patient/family/caregiver given information for GetWell Loop and agrees to enroll no      Discussed follow-up appointments. If no appointment was previously scheduled, appointment scheduling offered: Yes. Is follow up appointment scheduled within 7 days of discharge? Yes  Non-[facility] follow up appointment(s): Dr. David Patricio PCP 2/1/21    Plan for follow-up call in 5-7 days based on severity of symptoms and risk factors. Plan for next call: symptom management-., self management-. and follow up appointment-. CTN provided contact information for future needs.     Non-face-to-face services provided:  Obtained and reviewed discharge summary and/or continuity of care documents    Care Transitions 24 Hour Call    Care Transitions Interventions         Follow Up  Future Appointments   Date Time Provider Tata Jimenez   2/3/2021  2:45 PM DO HENRIETTA Peralta Cardio MMA       Biju Pimentel RN

## 2021-01-26 PROBLEM — I25.10 CORONARY ARTERY DISEASE INVOLVING NATIVE CORONARY ARTERY OF NATIVE HEART WITHOUT ANGINA PECTORIS: Status: ACTIVE | Noted: 2021-01-26

## 2021-01-26 PROBLEM — I10 ESSENTIAL HYPERTENSION: Status: ACTIVE | Noted: 2021-01-26

## 2021-01-26 PROBLEM — I25.5 ISCHEMIC CARDIOMYOPATHY: Status: ACTIVE | Noted: 2021-01-26

## 2021-01-26 PROBLEM — E78.5 DYSLIPIDEMIA: Status: ACTIVE | Noted: 2021-01-26

## 2021-01-27 ENCOUNTER — TELEPHONE (OUTPATIENT)
Dept: CARDIOLOGY CLINIC | Age: 61
End: 2021-01-27

## 2021-01-27 NOTE — ADT AUTH CERT
Review date 1/20/21 by Eliza Falcon RN       Review Status Review Entered   In Primary 1/27/2021 12:03      Criteria Review   1/20        Cardio  Interval History:    No recurrent chest pain on low dose Nitro gtt.  No LE edema, orthopnea, or oxygen requirements. Remains asymptomatic with COVID.     Impression/Recommendations:     Mr. Rafi Bright is a 61 y. o. male patient     Anterior STEMI S/P POBA mid LAD 1/13/21  MVCAD  Ischemic cardiomyopathy (LVEF 40-45%)  Obesity  Hypertension   Hyperlipidemia  CHELSEY  COVID19 Infection (+PCR 1/13/21)        POBA- mid LAD stent thrombosis/in stent restenosis  1/13/21  Multivessel disease with surgical turndown in the setting of COVID19. S/P Successful PCI proximal to distal LAD, distal LM, mid to distal LCX 1/20/21  Continue ASA/Brilinta for at least 12 months.   Continue  statin, beta blocker, low dose ACEI   Will discuss DC planning tomorrow.      Procedure note     Procedure: LHC, IVUS-guided PCI proximal to distal LAD, distal LM, PCI- mid to distal LCX   Complications: None     Impression/Recommendations:  Multivessel disease with surgical turndown in the setting of COVID19 infection. S/P PCI distal LM, proximal to distal LAD, mid to distal LCX. Continue ASA/Brilinta for at least 12 months. Will discuss [address] outpatient PCI-RCA.   --------------------------------------------             Ref.  Range 1/20/2021 03:55 1/20/2021 12:20 1/20/2021 16:07 1/20/2021 16:26 1/20/2021 16:32 1/20/2021 17:17 1/20/2021 17:59 1/20/2021 18:20 1/20/2021 18:28   Sodium Latest Ref Range: 136 - 145 mmol/L 139                   Potassium Latest Ref Range: 3.5 - 5.1 mmol/L 4.1                   Chloride Latest Ref Range: 99 - 110 mmol/L 107                   CO2 Latest Ref Range: 21 - 32 mmol/L 20 (L)                   BUN Latest Ref Range: 7 - 20 mg/dL 17                   Creatinine Latest Ref Range: 0.8 - 1.3 mg/dL 0.8                   Anion Gap Latest Ref Range: 3 - 16  12                   GFR Non- Latest Ref Range: >60  >60                   GFR  Latest Ref Range: >60  >60                   Glucose Latest Ref Range: 70 - 99 mg/dL 111 (H)                   Calcium Latest Ref Range: 8.3 - 10.6 mg/dL 9.4                   POC ACT LR Latest Ref Range: Not Establised sec     167 >400 312 371 271 >400 280   WBC Latest Ref Range: 4.0 - 11.0 K/uL 8.2                   RBC Latest Ref Range: 4.20 - 5.90 M/uL 4.79                   Hemoglobin Quant Latest Ref Range: 13.5 - 17.5 g/dL 14.4                   Hematocrit Latest Ref Range: 40.5 - 52.5 % 43.9                   MCV Latest Ref Range: 80.0 - 100.0 fL 91.7                   MCH Latest Ref Range: 26.0 - 34.0 pg 30.2                   MCHC Latest Ref Range: 31.0 - 36.0 g/dL 32.9                   MPV Latest Ref Range: 5.0 - 10.5 fL 9.3                   RDW Latest Ref Range: 12.4 - 15.4 % 13.3                   Platelet Count Latest Ref Range: 135 - 450 K/uL 161                   aPTT Latest Ref Range: 24.2 - 36.2 sec 84.3 (H) 62.1 (H)                       Meds  aspirin EC tablet 81 mg qd  atorvastatin (LIPITOR) tablet 80 mg qd  carbidopa-levodopa (SINEMET)  MG per tablet 1 tablet hs  cetirizine (ZYRTEC) tablet 10 mg qd  lisinopril (PRINIVIL;ZESTRIL) tablet 2.5 mg bid  metoprolol tartrate (LOPRESSOR) tablet 12.5 mg  bid  sodium chloride flush 0.9 % injection 10 mL bid  ticagrelor (BRILINTA) tablet 90 mg bid     Infusion  0.9 % sodium chloride infusion Rate: 75 mL/hr   heparin 25,000 units in dextrose 5% 250 mL infusion Rate: 12 mL/hr Dose: 12 Units/kg/hr - d/c 1852        PRN  iopamidol (ISOVUE-370) 76 % injection 300 mL x1      Myocardial Infarction - Care Day 9 (1/21/2021) by Benjamin Schneider RN       Review Status Review Entered   Completed 1/21/2021 16:47      Criteria Review      Care Day: 9 Care Date: 1/21/2021 Level of Care: ICU    Guideline Day 3    Clinical Status    (X) * Hemodynamic

## 2021-01-27 NOTE — TELEPHONE ENCOUNTER
Pt calling he said unfortunately [facility] is not in network with his insurance he [facility]  [employer] he would to know of any recommendations in that group?   pls call to advise thank you

## 2021-01-27 NOTE — TELEPHONE ENCOUNTER
LVM that [facility] OOT until Monday- he used to follow at [address] [facility], wanted to ask if he wanted to see one of those providers? If not I can ask other docs here and [facility] Monday when she returns for recs.

## 2021-01-29 NOTE — TELEPHONE ENCOUNTER
Spoke with Demetria Manrique- he checked in with insurance and is in network. He is scheduled for fu next week.

## 2021-01-29 NOTE — PROGRESS NOTES
2/3/21    PATIENT: Winnie Ortega  : 1960    Primary Care Provider:   Lucian Webb MD  21 [phone]    Reason for evaluation:   Chief Complaint   Patient presents with    Follow-Up from Hospital     STEMI       History of present illness:   Mr. Winnie Ortega is a 61 y.o. male patient, previously followed by [facility], here in close hospital follow up after undergoing complex multivessel stenting 21 after surgical turndown in the setting of COVID-19. Rafi presented to the ER in the setting of a STEMI 20 with complaints of \"crushing\" chest pain and diaphoresis while cleaning his deck to sell his house. LHC revealed multivessel disease, POBA to mid LAD stent thrombosis. Workup for CABG was complicated by positive COVID test, and he ultimately underwent MV PCI. Rafi states that he is \"feeling great\" since home and is walking 2 miles daily (pushing grandson in stroller) at mall. Denies chest pain, palpitations, lightheadedness, or syncope and is without shortness of breath, PND, orthopnea, or LE edema. He is looking to return to [facility]. Blood pressure checks at home 130-140s /80-90s. Patient is compliant with medications and is tolerating them well without adverse effects. No bleeding issues. Medical History:  History reviewed. No pertinent past medical history.     Surgical History:      Procedure Laterality Date    ELBOW SURGERY      rt elbow       Social History:  Social History     Socioeconomic History    Marital status:      Spouse name: Not on file    Number of children: Not on file    Years of education: Not on file    Highest education level: Not on file   Occupational History    Not on file   Social Needs    Financial resource strain: Not on file    Food insecurity     Worry: Not on file     Inability: Not on file    Transportation needs     Medical: Not on file     Non-medical: Not on file   Tobacco Use    Smoking status: Former Smoker     Packs/day: 1.00     Years: 25.00     Pack years: 25.00     Types: Cigarettes     Quit date: 2010     Years since quittin.1    Smokeless tobacco: Never Used   Substance and Sexual Activity    Alcohol use: Yes     Alcohol/week: 2.0 - 4.0 standard drinks     Types: 2 - 4 Cans of beer per week    Drug use: Never    Sexual activity: Not on file   Lifestyle    Physical activity     Days per week: Not on file     Minutes per session: Not on file    Stress: Not on file   Relationships    Social connections     Talks on phone: Not on file     Gets together: Not on file     Attends Zoroastrian service: Not on file     Active member of club or organization: Not on file     Attends meetings of clubs or organizations: Not on file     Relationship status: Not on file    Intimate partner violence     Fear of current or ex partner: Not on file     Emotionally abused: Not on file     Physically abused: Not on file     Forced sexual activity: Not on file   Other Topics Concern    Not on file   Social History Narrative    Not on file        Family History:  No evidence for sudden cardiac death or premature CAD. History reviewed. No pertinent family history. Medications:  [x] Medications and dosages reviewed. Prior to Admission medications    Medication Sig Start Date End Date Taking?  Authorizing Provider   carbidopa-levodopa (SINEMET)  MG per tablet Take 1 tablet by mouth every evening   Yes Historical Provider, MD   lansoprazole (PREVACID 24HR) 15 MG delayed release capsule Take 15 mg by mouth daily   Yes Historical Provider, MD   aspirin 81 MG EC tablet Take 1 tablet by mouth daily 21  Yes Nael Love, DO   methocarbamol (ROBAXIN) 500 MG tablet  14  Yes Historical Provider, MD   metoprolol tartrate (LOPRESSOR) 25 MG tablet Take 1 tablet by mouth 2 times daily 2/3/21   Nael Love DO   atorvastatin (LIPITOR) 80 MG tablet Take 1 tablet by mouth normal in size and function. 1/13/21  Left Heart Cath  Dominance: Right       LM: 40% eccentric distal stenosis   LAD: 90% ostial to proximal stenosis; 90% mid in stent restenosis , jailed second diagonal with 30% ostial narrowing ; 100% mid stent thrombosis at distal edge   LCx: diffuse disease with 90% mid and distal stenoses, with severe disease extending into proximal OM2   RCA: 70% proximal and 85% mid stenoses; RPLB and RPDA free of significant disease      LVEDP: 17 mmHg   LVEF: 25-30%      6 Fr XB 3.5 Guide   BMW wire to apical LAD with ease  2.5 x 12 mm Trek balloon to culprit occlusion   Restoration of PAULETTE III flow to wrap around apex        1/20/21  Cardiac Catheterization  Procedure: LHC, IVUS-guided PCI proximal to distal LAD, distal LM, PCI- mid to distal LCX    Findings:     LVEDP: 10 mmHg    6 Fr XB 3.5 Guide     Please see logging for predil and postdil details      Ultimately, 2.5 x 38 mm Xience NAZARIO, 3.5 x 38 mm Xience NAZARIO, and 4.0 x 12 mm Xience NAZARIO overlapping  from distal LAD to distal LM. 2.75 x 18 mm Xience NAZARIO to mid LCX with 2.25 x 18 mm Xience NAZARIO overlapping distally. 0% residual LM, LAD, and mid to distal LCX. Residual 70% ostial ramus and 40% ostial LCX. Severe, diffusely diseased smaller second and third marginals to be managed medically. Impression/Recommendations    Mr. Marilee Beckham is a 61 y.o. male patient with:    CAD; POBA mid LAD 1/13/21 in the setting of STEMI, IVUS-guided PCI proximal to distal LAD, distal LM, PCI- mid to distal LCX 1/20/21(following surgical turndown for CABG in the setting of COVID19 Infection (+PCR 1/13/21) )  Ischemic cardiomyopathy (LVEF 40-45%)  Hypertension, borderline control(132/88)  Hyperlipidemia, controlled (1/2021:   HDL 41 LDL 69)  CHELSEY  Obesity  Former tobacco use      Rafi is progressing well since returning home and is maintaining a walking regimen. Continue ASA/Brilinta for at least 12 months.  (January 2022) Continue statin, beta blocker, low dose ACEI as presently dosed. Stage PCI to RCA as outpatient to complete revascularization. Risks, benefits, goals, and alternatives of left heart catheterization with the potential for percutaneous coronary intervention discussed with patient; including stroke, heart attack, kidney damage, death, paralysis, disability, damage to nerves/arteries/veins. All questions answered and informed consent obtained. Further recommendations pending coronary angiography and clinical course. Thank you for allowing me to participate in the care of your patient. Please do not hesitate to call. Maria C Patino DO, [address] [facility]  Interventional Cardiology     o: [phone]  [address] [place] [facility]., [address], [address]      NOTE:  This report was transcribed using voice recognition software. Every effort was made to ensure accuracy; however, inadvertent computerized transcription errors may be present. Scribe's Attestation: This note was scribed in the presence of Dr. Radha Napoles DO by [place] RN.    I, Maria C Patino, have personally performed the services described in this documentation as scribed by Dickson Alpers. AARON Banad in my presence, and it is both accurate and complete. An electronic signature was used to authenticate this note.

## 2021-02-02 ENCOUNTER — CARE COORDINATION (OUTPATIENT)
Dept: CASE MANAGEMENT | Age: 61
End: 2021-02-02

## 2021-02-03 ENCOUNTER — OFFICE VISIT (OUTPATIENT)
Dept: CARDIOLOGY CLINIC | Age: 61
End: 2021-02-03
Payer: COMMERCIAL

## 2021-02-03 VITALS
HEIGHT: 66 IN | DIASTOLIC BLOOD PRESSURE: 88 MMHG | HEART RATE: 71 BPM | BODY MASS INDEX: 33.75 KG/M2 | SYSTOLIC BLOOD PRESSURE: 132 MMHG | OXYGEN SATURATION: 98 % | WEIGHT: 210 LBS

## 2021-02-03 DIAGNOSIS — I25.83 CORONARY ARTERY DISEASE DUE TO LIPID RICH PLAQUE: Primary | ICD-10-CM

## 2021-02-03 DIAGNOSIS — I25.10 CORONARY ARTERY DISEASE DUE TO LIPID RICH PLAQUE: Primary | ICD-10-CM

## 2021-02-03 PROCEDURE — 99214 OFFICE O/P EST MOD 30 MIN: CPT | Performed by: INTERNAL MEDICINE

## 2021-02-03 RX ORDER — ATORVASTATIN CALCIUM 80 MG/1
80 TABLET, FILM COATED ORAL NIGHTLY
Qty: 90 TABLET | Refills: 3 | Status: SHIPPED | OUTPATIENT
Start: 2021-02-03 | End: 2021-11-30 | Stop reason: SDUPTHER

## 2021-02-03 RX ORDER — LISINOPRIL 5 MG/1
2.5 TABLET ORAL 2 TIMES DAILY
Qty: 90 TABLET | Refills: 3 | Status: SHIPPED | OUTPATIENT
Start: 2021-02-03 | End: 2021-11-30 | Stop reason: SDUPTHER

## 2021-02-03 NOTE — PROGRESS NOTES
Per [facility] schedule LHC for staged PCI in March. Spoke with Rafi and gave instructions. Order placed. Med refilled to preferred pharmacy.

## 2021-02-05 ENCOUNTER — CARE COORDINATION (OUTPATIENT)
Dept: CASE MANAGEMENT | Age: 61
End: 2021-02-05

## 2021-02-05 NOTE — CARE COORDINATION
Final follow up outreach call attempt, no answer. CTN left  with contact information and request for return call. CTN will resolve episode and remain available.

## 2021-02-26 NOTE — OP NOTE
Cardiac Catheterization     Procedure: C, IVUS-guided PCI proximal to distal LAD, distal LM, PCI- mid to distal LCX   Complications: None  Medications: Procedural sedation with minimal conscious sedation (5 Versed/175 Fentanyl)  An independent trained observer pushed medications at my direction. We monitored the patient's level of consciousness and vital signs/physiologic status throughout the procedure duration (see start and stop times in log). Estimated Blood Loss: Less than 20 mL  Specimens: were not obtained  Access: 6 Fr Right Radial  Closure: TR Band  Contrast:  300 cc  Start time: 6038  Stop time: 2103  Fluoro time: 47.2  Findings:   LVEDP: 10 mmHg    6 Fr XB 3.5 Guide    Please see logging for predil and postdil details     Ultimately, 2.5 x 38 mm Xience NAZARIO, 3.5 x 38 mm Xience NAZARIO, and 4.0 x 12 mm Xience NAZARIO overlapping  from distal LAD to distal LM. 2.75 x 18 mm Xience NAZARIO to mid LCX with 2.25 x 18 mm Xience NAZARIO overlapping distally. 0% residual LM, LAD, and mid to distal LCX. Residual 70% ostial ramus and 40% ostial LCX. Severe, diffusely diseased smaller second and third marginals to be managed medically. Impression/Recommendations:  Multivessel disease with surgical turndown in the setting of COVID19 infection. S/P PCI distal LM, proximal to distal LAD, mid to distal LCX. Continue ASA/Brilinta for at least 12 months. Will discuss DC planning tomorrow with outpatient PCI-RCA.        Narayan Larry DO, [facility]  Interventional Cardiology     o: [phone]  [address] [place] [address]., [address] [facility], [address] [place] [address]
Cardiac Catheterization     Procedure: Emergent LHC, LVG, POBA- LAD; LIJ central line insertion   Complications: None  Medications: Procedural sedation with minimal conscious sedation (2 Versed/150 Fentanyl)  An independent trained observer pushed medications at my direction. We monitored the patient's level of consciousness and vital signs/physiologic status throughout the procedure duration (see start and stop times in log). Estimated Blood Loss: Less than 20 mL  Specimens: were not obtained  Access:  Right Radial 6 Fr; US guided Left IJ 5 Fr   Closure: TR Band , sutured in   Contrast:  121 cc  Start time: 1404  Stop time: 1513  Fluoro time: 6.2  Findings:     Left Heart Cath  Dominance: Right      LM: 40% eccentric distal stenosis   LAD: 90% ostial to proximal stenosis; 90% mid in stent restenosis , jailed second diagonal with 30% ostial narrowing ; 100% mid stent thrombosis at distal edge   LCx: diffuse disease with 90% mid and distal stenoses, with severe disease extending into proximal OM2   RCA: 70% proximal and 85% mid stenoses; RPLB and RPDA free of significant disease     LVEDP: 17 mmHg   LVEF: 25-30%     6 Fr XB 3.5 Guide   BMW wire to apical LAD with ease  2.5 x 12 mm Trek balloon to culprit occlusion   Restoration of PAULETTE III flow to wrap around apex     Impression/Recommendations:  POBA- mid LAD stent thrombosis with restoration of flow and minimal distal LAD disease   Discussed with CT surgeon Dr. Isauro Barrera in cath lab. Given complete resolution of chest pain and ECG segments, will transition Brilinta load to Cangrelor gtt and work up for CABG. Transfer to CVU on Cangrelor, Heparin, and Nitroglycerin gtts.   Obtain echocardiogram.       Idris Shafer DO, [facility]  Interventional Cardiology     o: [phone]  [address] [place] [address]., [address] [facility], [address] [place] [address]
show

## 2021-03-05 ENCOUNTER — TELEPHONE (OUTPATIENT)
Dept: CARDIOLOGY CLINIC | Age: 61
End: 2021-03-05

## 2021-03-05 NOTE — TELEPHONE ENCOUNTER
He is getting a stent 3/17/21 . He is getting his covid vaccine on Monday and wants to make sure that is ok that he get it before his stent .  pls call patient to advise

## 2021-03-17 ENCOUNTER — HOSPITAL ENCOUNTER (OUTPATIENT)
Dept: CARDIAC CATH/INVASIVE PROCEDURES | Age: 61
Discharge: HOME | End: 2021-03-17
Attending: INTERNAL MEDICINE | Admitting: INTERNAL MEDICINE
Payer: COMMERCIAL

## 2021-03-17 VITALS
DIASTOLIC BLOOD PRESSURE: 88 MMHG | SYSTOLIC BLOOD PRESSURE: 151 MMHG | RESPIRATION RATE: 18 BRPM | BODY MASS INDEX: 33.75 KG/M2 | HEIGHT: 66 IN | WEIGHT: 210 LBS | HEART RATE: 62 BPM | TEMPERATURE: 98.4 F | OXYGEN SATURATION: 99 %

## 2021-03-17 LAB
ANION GAP SERPL CALCULATED.3IONS-SCNC: 11 MMOL/L (ref 3–16)
BUN BLDV-MCNC: 13 MG/DL (ref 7–20)
CALCIUM SERPL-MCNC: 9.7 MG/DL (ref 8.3–10.6)
CHLORIDE BLD-SCNC: 106 MMOL/L (ref 99–110)
CO2: 23 MMOL/L (ref 21–32)
CREAT SERPL-MCNC: 0.9 MG/DL (ref 0.8–1.3)
EKG ATRIAL RATE: 62 BPM
EKG DIAGNOSIS: NORMAL
EKG P AXIS: 19 DEGREES
EKG P-R INTERVAL: 166 MS
EKG Q-T INTERVAL: 418 MS
EKG QRS DURATION: 86 MS
EKG QTC CALCULATION (BAZETT): 424 MS
EKG R AXIS: 22 DEGREES
EKG T AXIS: 19 DEGREES
EKG VENTRICULAR RATE: 62 BPM
GFR AFRICAN AMERICAN: >60
GFR NON-AFRICAN AMERICAN: >60
GLUCOSE BLD-MCNC: 104 MG/DL (ref 70–99)
HCT VFR BLD CALC: 43.8 % (ref 40.5–52.5)
HEMOGLOBIN: 14.5 G/DL (ref 13.5–17.5)
MCH RBC QN AUTO: 29.9 PG (ref 26–34)
MCHC RBC AUTO-ENTMCNC: 33.1 G/DL (ref 31–36)
MCV RBC AUTO: 90.5 FL (ref 80–100)
PDW BLD-RTO: 13.5 % (ref 12.4–15.4)
PLATELET # BLD: 161 K/UL (ref 135–450)
PMV BLD AUTO: 8.7 FL (ref 5–10.5)
POTASSIUM SERPL-SCNC: 4.1 MMOL/L (ref 3.5–5.1)
RBC # BLD: 4.84 M/UL (ref 4.2–5.9)
SODIUM BLD-SCNC: 140 MMOL/L (ref 136–145)
WBC # BLD: 7.9 K/UL (ref 4–11)

## 2021-03-17 PROCEDURE — C1725 CATH, TRANSLUMIN NON-LASER: HCPCS

## 2021-03-17 PROCEDURE — 92928 PRQ TCAT PLMT NTRAC ST 1 LES: CPT

## 2021-03-17 PROCEDURE — 6360000002 HC RX W HCPCS

## 2021-03-17 PROCEDURE — 93458 L HRT ARTERY/VENTRICLE ANGIO: CPT

## 2021-03-17 PROCEDURE — 99153 MOD SED SAME PHYS/QHP EA: CPT

## 2021-03-17 PROCEDURE — C1887 CATHETER, GUIDING: HCPCS

## 2021-03-17 PROCEDURE — 36415 COLL VENOUS BLD VENIPUNCTURE: CPT

## 2021-03-17 PROCEDURE — 93005 ELECTROCARDIOGRAM TRACING: CPT | Performed by: INTERNAL MEDICINE

## 2021-03-17 PROCEDURE — 99152 MOD SED SAME PHYS/QHP 5/>YRS: CPT

## 2021-03-17 PROCEDURE — 85347 COAGULATION TIME ACTIVATED: CPT

## 2021-03-17 PROCEDURE — 92928 PRQ TCAT PLMT NTRAC ST 1 LES: CPT | Performed by: INTERNAL MEDICINE

## 2021-03-17 PROCEDURE — C1894 INTRO/SHEATH, NON-LASER: HCPCS

## 2021-03-17 PROCEDURE — 2709999900 HC NON-CHARGEABLE SUPPLY

## 2021-03-17 PROCEDURE — 85027 COMPLETE CBC AUTOMATED: CPT

## 2021-03-17 PROCEDURE — 93010 ELECTROCARDIOGRAM REPORT: CPT | Performed by: INTERNAL MEDICINE

## 2021-03-17 PROCEDURE — C1874 STENT, COATED/COV W/DEL SYS: HCPCS

## 2021-03-17 PROCEDURE — 80048 BASIC METABOLIC PNL TOTAL CA: CPT

## 2021-03-17 PROCEDURE — 6360000004 HC RX CONTRAST MEDICATION: Performed by: INTERNAL MEDICINE

## 2021-03-17 PROCEDURE — 2500000003 HC RX 250 WO HCPCS

## 2021-03-17 PROCEDURE — 99152 MOD SED SAME PHYS/QHP 5/>YRS: CPT | Performed by: INTERNAL MEDICINE

## 2021-03-17 PROCEDURE — C1769 GUIDE WIRE: HCPCS

## 2021-03-17 RX ADMIN — IOPAMIDOL 305 ML: 755 INJECTION, SOLUTION INTRAVENOUS at 12:42

## 2021-03-17 NOTE — H&P
Cardiovascular History & Physical     PATIENT: Marilee Beckham  : 1960  MRN: [id number]    Chief complaint:   CMP, severe CAD    History of present illness:               Mr. Marilee Beckham is a 61 y.o. male patient, previously followed by [facility], here in close hospital follow up after undergoing complex multivessel stenting 21 after surgical turndown in the setting of COVID-19. Rafi presented to the ER in the setting of a STEMI 20 with complaints of \"crushing\" chest pain and diaphoresis while cleaning his deck to sell his house. LHC revealed multivessel disease, POBA to mid LAD stent thrombosis. Workup for CABG was complicated by positive COVID test, and he ultimately underwent MV PCI. Rafi states that he is \"feeling great\" since home and is walking 2 miles daily (pushing grandson in stroller) at mall. Denies chest pain, palpitations, lightheadedness, or syncope and is without shortness of breath, PND, orthopnea, or LE edema. He is looking to return to [facility]. Blood pressure checks at home 130-140s /80-90s. Patient is compliant with medications and is tolerating them well without adverse effects. No bleeding issues. Medical History:  No past medical history on file.     Surgical History:      Procedure Laterality Date    ELBOW SURGERY      rt elbow       Social History:  Social History     Socioeconomic History    Marital status:      Spouse name: Not on file    Number of children: Not on file    Years of education: Not on file    Highest education level: Not on file   Occupational History    Not on file   Social Needs    Financial resource strain: Not on file    Food insecurity     Worry: Not on file     Inability: Not on file    Transportation needs     Medical: Not on file     Non-medical: Not on file   Tobacco Use    Smoking status: Former Smoker     Packs/day: 1.00     Years: 25.00     Pack years: 25.00     Types: Cigarettes     Quit date: 2010     Years since quittin.2    Smokeless tobacco: Never Used   Substance and Sexual Activity    Alcohol use: Yes     Alcohol/week: 2.0 - 4.0 standard drinks     Types: 2 - 4 Cans of beer per week    Drug use: Never    Sexual activity: Not on file   Lifestyle    Physical activity     Days per week: Not on file     Minutes per session: Not on file    Stress: Not on file   Relationships    Social connections     Talks on phone: Not on file     Gets together: Not on file     Attends Shinto service: Not on file     Active member of club or organization: Not on file     Attends meetings of clubs or organizations: Not on file     Relationship status: Not on file    Intimate partner violence     Fear of current or ex partner: Not on file     Emotionally abused: Not on file     Physically abused: Not on file     Forced sexual activity: Not on file   Other Topics Concern    Not on file   Social History Narrative    Not on file        Family History:  No evidence for sudden cardiac death or premature CAD. No family history on file. Medications:  Prior to Admission medications    Medication Sig Start Date End Date Taking?  Authorizing Provider   carbidopa-levodopa (SINEMET)  MG per tablet Take 1 tablet by mouth every evening    Historical Provider, MD   metoprolol tartrate (LOPRESSOR) 25 MG tablet Take 1 tablet by mouth 2 times daily 2/3/21   Jasson Thapa, DO   atorvastatin (LIPITOR) 80 MG tablet Take 1 tablet by mouth nightly 2/3/21   Jasson Thapa, DO   lisinopril (PRINIVIL;ZESTRIL) 5 MG tablet Take 0.5 tablets by mouth 2 times daily 2/3/21   Jasson Thapa, DO   ticagrelor (BRILINTA) 90 MG TABS tablet Take 1 tablet by mouth 2 times daily 2/3/21   Jasson Thapa, DO   lansoprazole (PREVACID 24HR) 15 MG delayed release capsule Take 15 mg by mouth daily    Historical Provider, MD   aspirin 81 MG EC tablet Take 1 tablet by mouth daily 21   Jasson Thapa, DO methocarbamol (ROBAXIN) 500 MG tablet  11/21/14   Historical Provider, MD       Allergies:  Patient has no known allergies. Review of Systems:   [x]Full ROS obtained and negative except as mentioned in HPI    Physical Examination:    There were no vitals taken for this visit. Wt Readings from Last 3 Encounters:   02/03/21 210 lb (95.3 kg)   01/20/21 214 lb 11.7 oz (97.4 kg)   01/14/15 190 lb (86.2 kg)       GENERAL: Well developed, well nourished, no acute distress  NEUROLOGICAL: Alert and oriented x3  PSYCH: Normal mood and affect   SKIN: Warm and dry, without lesions  HEENT: Normocephalic, atraumatic, Sclera non-icteric, mucous membranes moist  NECK: supple, JVP normal, thyroid not enlarged   CAROTID: Normal upstroke, no bruits  CARDIAC: Normal PMI, regular rate and rhythm, normal S1S2, no murmur, rub  RESPIRATORY: Normal respiratory effort, clear to auscultation bilaterally  EXTREMITIES: No cyanosis, clubbing or edema, palpable pulses bilaterally   MUSCULOSKELETAL: No joint swelling or tenderness, no chest wall tenderness  GASTROINTESTINAL:  soft, non-tender, no bruit    Labs:  Lab Review   Lab Results   Component Value Date     01/21/2021    K 4.2 01/21/2021     01/21/2021    CO2 21 01/21/2021    BUN 16 01/21/2021    CREATININE 0.9 01/21/2021    GLUCOSE 102 01/21/2021    CALCIUM 9.6 01/21/2021     No results found for: CKTOTAL, CKMB, CKMBINDEX, TROPONINI  Lab Results   Component Value Date    WBC 9.0 01/21/2021    HGB 14.5 01/21/2021    HCT 43.3 01/21/2021    MCV 91.0 01/21/2021     01/21/2021     Lab Results   Component Value Date    CHOL 145 01/13/2021    TRIG 173 01/13/2021    HDL 41 01/13/2021         Imaging:  I have reviewed the below testing personally:  [facility]   1. Non-diagnostic ECG for ischemia with pharmocologic stress. 2.No significant areas of ischemia identified with pharmocologic      stress.    3.Nuclear stress image findings indicate low risk for future      ischemic event .     ECHO 1/14/21   Summary   Overall, left ventricular systolic function is mildly depressed.   Ejection fraction is visually estimated to be 40-45%.   Mild mitral regurgitation.   The right ventricle is normal in size and function.     1/13/21  Left Heart Cath  Dominance: Right       LM: 40% eccentric distal stenosis   LAD: 90% ostial to proximal stenosis; 90% mid in stent restenosis , jailed second diagonal with 30% ostial narrowing ; 100% mid stent thrombosis at distal edge   LCx: diffuse disease with 90% mid and distal stenoses, with severe disease extending into proximal OM2   RCA: 70% proximal and 85% mid stenoses; RPLB and RPDA free of significant disease      LVEDP: 17 mmHg   LVEF: 25-30%      6 Fr XB 3.5 Guide   BMW wire to apical LAD with ease  2.5 x 12 mm Trek balloon to culprit occlusion   Restoration of PAULETTE III flow to wrap around apex         1/20/21  Cardiac Catheterization  Procedure: LHC, IVUS-guided PCI proximal to distal LAD, distal LM, PCI- mid to distal LCX    Findings:      LVEDP: 10 mmHg     6 Fr XB 3.5 Guide     Please see logging for predil and postdil details      Ultimately, 2.5 x 38 mm Xience NAZARIO, 3.5 x 38 mm Xience NAZARIO, and 4.0 x 12 mm Xience NAZARIO overlapping  from distal LAD to distal LM. 2.75 x 18 mm Xience NAZARIO to mid LCX with 2.25 x 18 mm Xience NAZARIO overlapping distally.      0% residual LM, LAD, and mid to distal LCX. Residual 70% ostial ramus and 40% ostial LCX. Severe, diffusely diseased smaller second and third marginals to be managed medically.         Impression/Recommendations     Mr. Jeff Rhodes is a 61 y.o. male patient with:     CAD; POBA mid LAD 1/13/21 in the setting of STEMI, IVUS-guided PCI proximal to distal LAD, distal LM, PCI- mid to distal LCX 1/20/21(following surgical turndown for CABG in the setting of COVID19 Infection (+PCR 1/13/21) )  Ischemic cardiomyopathy (LVEF 40-45%)  Hypertension, borderline control(132/88)  Hyperlipidemia,

## 2021-03-17 NOTE — OP NOTE
Cardiac Catheterization     Procedure:  LHC, PCI-RCA  Complications: None  Medications: Procedural sedation with minimal conscious sedation (5 Versed/200 Fentanyl)  An independent trained observer pushed medications at my direction. We monitored the patient's level of consciousness and vital signs/physiologic status throughout the procedure duration (see start and stop times in log). Estimated Blood Loss: Less than 20 mL  Specimens: were not obtained  Access:  6 Fr Right Radial   Closure: TR Band   Contrast:  305 cc  Start time: 8445  Stop time: 7229  Fluoro time: 44.4  Findings:     Left Heart Cath  Dominance: Right      LM: distal stent patent  LAD: proximal mid and distal stents widely patent  RI: smaller vessel with 70% ostial stenosis  LCx: 40% ostial disease prior to patent proximal and mid stents. Severe, diffusely diseased smaller second and third marginals. RCA: 70% proximal, 85% mid, 70% distal stenoses; RPLB and RPDA free of significant disease     LVEDP: 16 mmHg     Ultimately, AR1 guide with Guideliner to deliver   3.5 x 12 mm Xience NAZARIO proximal overlapping 3.25 x28 mm Xience NAZARIO mid    3.0 x 15 mm Xience NAZARIO overlapping 2.75 x 15 mm Xience NAZARIO distal     0% residual     Impression/Recommendations:  RCA disease addressed with two overlapping drug eluting stents both proximally and distally. Previous LAD and LCX stents widely patent. Medical management of residual branch disease. Continue ASA/Ticagrelor for at least 12 months preferably. Ailyn Cummings DO, [facility]  Interventional Cardiology     o: [phone]  [address] [facility], [address]      NOTE:  This report was transcribed using voice recognition software. Every effort was made to ensure accuracy; however, inadvertent computerized transcription errors may be present.

## 2021-03-17 NOTE — PRE SEDATION
Brief Pre-Op Note/Sedation Assessment      Otis Graham  1960  Cath/NONE      [id number]  10:03 AM    Planned Procedure: Cardiac Catheterization Procedure    Post Procedure Plan: Return to same level of care    Consent: I have discussed with the patient and/or the patient representative the indication, alternatives, and the possible risks and/or complications of the planned procedure and the anesthesia methods. The patient and/or patient representative appear to understand and agree to proceed. Chief Complaint: Anginal Equivalent      Indications for Cath Procedure:  CMP  Anginal Classification within 2 weeks:  No symptoms  NYHA Heart Failure Class within 2 weeks: No symptoms  Is Cath Lab Visit Valve-related?: No  Surgical Risk: Intermediate  Functional Type: >= 4 METS without symptoms    Anti- Anginal Meds within 2 weeks:   Yes: Beta Blockers, Aspirin, Non-Statin (Any) and Statin (Any)    Stress or Imaging Studies Performed:  None     Vital Signs:  BP (!) 151/88   Pulse 62   Temp 98.4 °F (36.9 °C) (Temporal)   Resp 18   Ht 5' 6\" (1.676 m)   Wt 210 lb (95.3 kg)   SpO2 99%   BMI 33.89 kg/m²     Allergies:  No Known Allergies    Past Medical History:  No past medical history on file. Surgical History:  Past Surgical History:   Procedure Laterality Date    ELBOW SURGERY      rt elbow         Medications:  No current facility-administered medications for this encounter. Pre-Sedation:    Pre-Sedation Documentation and Exam:  I have assessed the patient and agree with the H&P present on the chart. Prior History of Anesthesia Complications:   difficult intubation    Modified Mallampati:  III (soft palate, base of uvula visible)    ASA Classification:  Class 3 - A patient with severe systemic disease that limits activity but is not incapacitating      Gabino Scale:   Activity:  2 - Able to move 4 extremities voluntarily on command  Respiration:  2 - Able to breathe deeply and cough freely  Circulation:  2 - BP+/- 20mmHg of normal  Consciousness:  2 - Fully awake  Oxygen Saturation (color):  2 - Able to maintain oxygen saturation >92% on room air    Sedation/Anesthesia Plan:  Guard the patient's safety and welfare. Minimize physical discomfort and pain. Minimize negative psychological responses to treatment by providing sedation and analgesia and maximize the potential amnesia. Patient to meet pre-procedure discharge plan. Medication Planned:  midazolam intravenously and fentanyl intravenously    Patient is an appropriate candidate for plan of sedation: yes    The risks, benefits, goals, and alternatives of the procedure were discussed in detail with the patient. Informed consent was obtained and further recommendations will be made following the procedure. Karla Jerez DO, [facility] [place]  Interventional Cardiology     o: [phone]  [address] [place] [address]., [address] [facility], [address] [place] [address]      NOTE:  This report was transcribed using voice recognition software. Every effort was made to ensure accuracy; however, inadvertent computerized transcription errors may be present.

## 2021-03-18 LAB
EKG ATRIAL RATE: 70 BPM
EKG DIAGNOSIS: NORMAL
EKG P AXIS: 49 DEGREES
EKG P-R INTERVAL: 162 MS
EKG Q-T INTERVAL: 418 MS
EKG QRS DURATION: 92 MS
EKG QTC CALCULATION (BAZETT): 451 MS
EKG R AXIS: 12 DEGREES
EKG T AXIS: 60 DEGREES
EKG VENTRICULAR RATE: 70 BPM

## 2021-03-18 PROCEDURE — 93010 ELECTROCARDIOGRAM REPORT: CPT | Performed by: INTERNAL MEDICINE

## 2021-03-19 LAB
POC ACT LR: 219 SEC
POC ACT LR: 313 SEC
POC ACT LR: 323 SEC
POC ACT LR: 328 SEC
POC ACT LR: 345 SEC
POC ACT LR: 376 SEC

## 2021-03-19 NOTE — PROGRESS NOTES
3/23/21    PATIENT: Kwabena Fontaine  : 1960    Primary Care Provider:   Kevin Perez MD  21 [phone]    Reason for evaluation:   Chief Complaint   Patient presents with    Coronary Artery Disease     no cardiac symptoms at this time    Follow-Up from Hospital     post Cath     History of present illness:   Mr. Kwabena Fontaine is a 61 y.o. male patient here in close cardiovascular follow up after staged PCI to RCA. Rafi first became known to me after presenting to the ER in the setting of a STEMI 21 with complaints of \"crushing\" chest pain and diaphoresis while cleaning his deck to sell his house. He ultimately underwent complex multivessel stenting 21 after surgical turndown in the setting of COVID-19 infection. Home for the past week and says that he has had no further chest pain or shortness of breath. 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 He states that he has been walking many miles in the new neighborhood and had no exertional concerns. Patient is compliant with medications and is tolerating them well without adverse effects. Medical History:  History reviewed. No pertinent past medical history.     Surgical History:      Procedure Laterality Date    ELBOW SURGERY      rt elbow       Social History:  Social History     Socioeconomic History    Marital status:      Spouse name: Not on file    Number of children: Not on file    Years of education: Not on file    Highest education level: Not on file   Occupational History    Not on file   Social Needs    Financial resource strain: Not on file    Food insecurity     Worry: Not on file     Inability: Not on file    Transportation needs     Medical: Not on file     Non-medical: Not on file   Tobacco Use    Smoking status: Former Smoker Packs/day: 1.00     Years: 25.00     Pack years: 25.00     Types: Cigarettes     Quit date: 2010     Years since quittin.2    Smokeless tobacco: Never Used   Substance and Sexual Activity    Alcohol use: Yes     Alcohol/week: 2.0 - 4.0 standard drinks     Types: 2 - 4 Cans of beer per week     Comment: occasional    Drug use: Never    Sexual activity: Not on file   Lifestyle    Physical activity     Days per week: Not on file     Minutes per session: Not on file    Stress: Not on file   Relationships    Social connections     Talks on phone: Not on file     Gets together: Not on file     Attends [employer] service: Not on file     Active member of club or organization: Not on file     Attends meetings of clubs or organizations: Not on file     Relationship status: Not on file    Intimate partner violence     Fear of current or ex partner: Not on file     Emotionally abused: Not on file     Physically abused: Not on file     Forced sexual activity: Not on file   Other Topics Concern    Not on file   Social History Narrative    Not on file        Family History:  No evidence for sudden cardiac death or premature CAD. History reviewed. No pertinent family history. Medications:  [x] Medications and dosages reviewed. Prior to Admission medications    Medication Sig Start Date End Date Taking?  Authorizing Provider   metoprolol succinate (TOPROL XL) 50 MG extended release tablet Take 1.5 tablets by mouth nightly 3/23/21  Yes Arlean Flatter, DO   carbidopa-levodopa (SINEMET)  MG per tablet Take 1 tablet by mouth every evening   Yes Historical Provider, MD   atorvastatin (LIPITOR) 80 MG tablet Take 1 tablet by mouth nightly 2/3/21  Yes Arlean Flatter, DO   lisinopril (PRINIVIL;ZESTRIL) 5 MG tablet Take 0.5 tablets by mouth 2 times daily 2/3/21  Yes Arlean Flatter, DO   ticagrelor (BRILINTA) 90 MG TABS tablet Take 1 tablet by mouth 2 times daily 2/3/21  Yes Arlean Flatter, DO lansoprazole (PREVACID 24HR) 15 MG delayed release capsule Take 15 mg by mouth daily   Yes Historical Provider, MD   aspirin 81 MG EC tablet Take 1 tablet by mouth daily 1/22/21  Yes Elvira Miller DO   methocarbamol (ROBAXIN) 500 MG tablet Take 500 mg by mouth daily  11/21/14  Yes Historical Provider, MD       Allergies:  Patient has no known allergies. Review of Systems:    [x]Full ROS obtained and negative except as mentioned in HPI    Physical Examination:    /84 (Site: Left Upper Arm, Position: Sitting, Cuff Size: Medium Adult)   Pulse 76   Ht 5' 6\" (1.676 m)   Wt 215 lb 1.6 oz (97.6 kg)   SpO2 98%   BMI 34.72 kg/m²   Wt Readings from Last 3 Encounters:   03/23/21 215 lb 1.6 oz (97.6 kg)   03/17/21 210 lb (95.3 kg)   02/03/21 210 lb (95.3 kg)     Vitals:    03/23/21 0742   BP: 122/84   Pulse: 76   SpO2: 98%       · GENERAL: Well developed, well nourished, no acute distress  · NEUROLOGICAL: Alert and oriented x3  · PSYCH: Normal mood and affect   · SKIN: Warm and dry  · HEENT: Normocephalic, atraumatic, Sclera non-icteric, mucous membranes moist  · NECK: supple, JVP normal  · CARDIAC: Normal PMI, regular rate and rhythm, normal S1S2, no murmur, rub, or gallop  · RESPIRATORY: Normal respiratory effort, clear to auscultation bilaterally  · EXTREMITIES: no edema or clubbing, +2 pulses bilaterally   · MUSCULOSKELETAL: No joint swelling or tenderness, no chest wall tenderness  · GASTROINTESTINAL: normal bowel sounds, soft, non-tender      Imaging:  I have reviewed the below testing personally:  ECHO 1/14/21   Summary   Overall, left ventricular systolic function is mildly depressed. Ejection fraction is visually estimated to be 40-45%. Mild mitral regurgitation. The right ventricle is normal in size and function.     1/13/21  Left Heart Cath  Dominance: Right       LM: 40% eccentric distal stenosis   LAD: 90% ostial to proximal stenosis; 90% mid in stent restenosis , jailed second diagonal with 30% ostial narrowing ; 100% mid stent thrombosis at distal edge   LCx: diffuse disease with 90% mid and distal stenoses, with severe disease extending into proximal OM2   RCA: 70% proximal and 85% mid stenoses; RPLB and RPDA free of significant disease      LVEDP: 17 mmHg   LVEF: 25-30%      6 Fr XB 3.5 Guide   BMW wire to apical LAD with ease  2.5 x 12 mm Trek balloon to culprit occlusion   Restoration of PAULETTE III flow to wrap around apex        1/20/21  Cardiac Catheterization  Procedure: [facility], IVUS-guided PCI proximal to distal LAD, distal LM, PCI- mid to distal LCX    Findings:     LVEDP: 10 mmHg    6 Fr XB 3.5 Guide     Please see logging for predil and postdil details      Ultimately, 2.5 x 38 mm Xience NAZARIO, 3.5 x 38 mm Xience NAZARIO, and 4.0 x 12 mm Xience NAZARIO overlapping  from distal LAD to distal LM. 2.75 x 18 mm Xience NAZARIO to mid LCX with 2.25 x 18 mm Xience NAZARIO overlapping distally. 0% residual LM, LAD, and mid to distal LCX. Residual 70% ostial ramus and 40% ostial LCX. Severe, diffusely diseased smaller second and third marginals to be managed medically. 3/17/21  Left Heart Cath  Dominance: Right       LM: distal stent patent  LAD: proximal mid and distal stents widely patent  RI: smaller vessel with 70% ostial stenosis  LCx: 40% ostial disease prior to patent proximal and mid stents. Severe, diffusely diseased smaller second and third marginals.   RCA: 70% proximal, 85% mid, 70% distal stenoses; RPLB and RPDA free of significant disease      LVEDP: 16 mmHg      Ultimately, AR1 guide with Guideliner to deliver   3.5 x 12 mm Xience NAZARIO proximal overlapping 3.25 x28 mm Xience NAZARIO mid     3.0 x 15 mm Xience NAZARIO overlapping 2.75 x 15 mm Xience NAZARIO distal      0% residual      Impression/Recommendations    Mr. Abbie Randall is a 61 y.o. male patient with:    CAD; POBA mid LAD 1/13/21 in the setting of STEMI, IVUS-guided PCI proximal to distal LAD, distal LM, PCI- mid to distal LCX 1/20/21 (following surgical turndown for CABG in the setting of COVID19 Infection (+PCR 1/13/21)), staged PCI to RCA, LAD and LCx stents widely patent by [facility] 3/17/21  Ischemic cardiomyopathy (LVEF 40-45%)  Hypertension, controlled today (122/84 today)  Hyperlipidemia, controlled (1/2021:   HDL 41 LDL 69)  CHELSEY  Obesity  Former tobacco use    Continue ASA/Brilinta for at least 12 months. (Preferably March 2022)  Continue statin, beta blocker, ACEI. Rafi prefers to simulate cardiac rehab at home. Remains without angina or signs of congestion. Echocardiogram at three month follow up. Patient Instructions   Stop Metoprolol Tartrate (Lopressor)    Start taking Metoprolol Succinate (Toprol) 75 mg nightly    Call us if you have any concerns or questions    Follow up in 3 months with an echocardiogram the same day    Orders Placed This Encounter   Procedures    Echo 2D w doppler w color complete     Standing Status:   Future     Standing Expiration Date:   3/23/2022     Order Specific Question:   Reason for exam:     Answer:   icm, cad, htn, hx covid 19         Thank you for allowing me to participate in the care of your patient. Please do not hesitate to call. Idris Shafer DO, [facility] [place]  Interventional Cardiology     o: [phone]  [address] [place] [address]., [address], [address] [facility]      NOTE:  This report was transcribed using voice recognition software. Every effort was made to ensure accuracy; however, inadvertent computerized transcription errors may be present. Scribe's Attestation: This note was scribed in the presence of Dr. Rosette Mays DO by Martha Cruz RN.    I, Idris Shafer, have personally performed the services described in this documentation as scribed by Hayley Banda RN in my presence, and it is both accurate and complete. An electronic signature was used to authenticate this note.

## 2021-03-23 ENCOUNTER — OFFICE VISIT (OUTPATIENT)
Dept: CARDIOLOGY CLINIC | Age: 61
End: 2021-03-23
Payer: COMMERCIAL

## 2021-03-23 VITALS
BODY MASS INDEX: 34.57 KG/M2 | HEART RATE: 76 BPM | HEIGHT: 66 IN | SYSTOLIC BLOOD PRESSURE: 122 MMHG | OXYGEN SATURATION: 98 % | DIASTOLIC BLOOD PRESSURE: 84 MMHG | WEIGHT: 215.1 LBS

## 2021-03-23 DIAGNOSIS — Z87.891 EX-SMOKER: ICD-10-CM

## 2021-03-23 DIAGNOSIS — I25.5 ISCHEMIC CARDIOMYOPATHY: ICD-10-CM

## 2021-03-23 DIAGNOSIS — I25.10 CORONARY ARTERY DISEASE INVOLVING NATIVE CORONARY ARTERY OF NATIVE HEART WITHOUT ANGINA PECTORIS: Primary | ICD-10-CM

## 2021-03-23 DIAGNOSIS — U07.1 COVID-19: ICD-10-CM

## 2021-03-23 DIAGNOSIS — I10 ESSENTIAL HYPERTENSION: ICD-10-CM

## 2021-03-23 PROCEDURE — 99214 OFFICE O/P EST MOD 30 MIN: CPT | Performed by: INTERNAL MEDICINE

## 2021-03-23 RX ORDER — METOPROLOL SUCCINATE 50 MG/1
75 TABLET, EXTENDED RELEASE ORAL NIGHTLY
Qty: 135 TABLET | Refills: 3 | Status: SHIPPED | OUTPATIENT
Start: 2021-03-23 | End: 2021-11-30 | Stop reason: SDUPTHER

## 2021-03-23 NOTE — PATIENT INSTRUCTIONS
Stop Metoprolol Tartrate (Lopressor)    Start taking Metoprolol Succinate (Toprol) 75 mg nightly    Call us if you have any concerns or questions    Follow up in 3 months with an echocardiogram the same day

## 2021-03-23 NOTE — LETTER
[address] [place] [address]  Phone: [phone]  Fax: [phone]    Lorisamira SantanaDO        2021     [address] [place] [address]    Patient: Jeff Rhodes  MR Number: [id number]  YOB: 1960  Date of Visit: 3/23/2021    Dear Dr. Omi Mixon:                                         3/23/21    PATIENT: Jeff Rhodes  : 1960    Primary Care Provider:   Omi Mixon MD  21 [phone]    Reason for evaluation:   Chief Complaint   Patient presents with    Coronary Artery Disease     no cardiac symptoms at this time    Follow-Up from Hospital     post Cath     History of present illness:   Mr. Jeff Rhodes is a 61 y.o. male patient here in close cardiovascular follow up after staged PCI to RCA. Rafi first became known to me after presenting to the ER in the setting of a STEMI 21 with complaints of \"crushing\" chest pain and diaphoresis while cleaning his deck to sell his house. He ultimately underwent complex multivessel stenting 21 after surgical turndown in the setting of COVID-19 infection. Home for the past week and says that he has had no further chest pain or shortness of breath. 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 He states that he has been walking many miles in the new neighborhood and had no exertional concerns. Patient is compliant with medications and is tolerating them well without adverse effects. Medical History:  History reviewed. No pertinent past medical history.     Surgical History:      Procedure Laterality Date    ELBOW SURGERY      rt elbow       Social History:  Social History     Socioeconomic History    Marital status:      Spouse name: Not on file    Number of children: Not on file  Years of education: Not on file    Highest education level: Not on file   Occupational History    Not on file   Social Needs    Financial resource strain: Not on file    Food insecurity     Worry: Not on file     Inability: Not on file    Transportation needs     Medical: Not on file     Non-medical: Not on file   Tobacco Use    Smoking status: Former Smoker     Packs/day: 1.00     Years: 25.00     Pack years: 25.00     Types: Cigarettes     Quit date: 2010     Years since quittin.2    Smokeless tobacco: Never Used   Substance and Sexual Activity    Alcohol use: Yes     Alcohol/week: 2.0 - 4.0 standard drinks     Types: 2 - 4 Cans of beer per week     Comment: occasional    Drug use: Never    Sexual activity: Not on file   Lifestyle    Physical activity     Days per week: Not on file     Minutes per session: Not on file    Stress: Not on file   Relationships    Social connections     Talks on phone: Not on file     Gets together: Not on file     Attends Oriental orthodox service: Not on file     Active member of club or organization: Not on file     Attends meetings of clubs or organizations: Not on file     Relationship status: Not on file    Intimate partner violence     Fear of current or ex partner: Not on file     Emotionally abused: Not on file     Physically abused: Not on file     Forced sexual activity: Not on file   Other Topics Concern    Not on file   Social History Narrative    Not on file        Family History:  No evidence for sudden cardiac death or premature CAD. History reviewed. No pertinent family history. Medications:  [x] Medications and dosages reviewed. Prior to Admission medications    Medication Sig Start Date End Date Taking?  Authorizing Provider   metoprolol succinate (TOPROL XL) 50 MG extended release tablet Take 1.5 tablets by mouth nightly 3/23/21  Yes Stephanie Pierre,    carbidopa-levodopa (SINEMET)  MG per tablet Take 1 tablet by mouth every evening NAZARIO proximal overlapping 3.25 x28 mm Xience NAZARIO mid     3.0 x 15 mm Xience NAZARIO overlapping 2.75 x 15 mm Xience NAZARIO distal      0% residual      Impression/Recommendations    Mr. Marilee Beckham is a 61 y.o. male patient with:    CAD; POBA mid LAD 1/13/21 in the setting of STEMI, IVUS-guided PCI proximal to distal LAD, distal LM, PCI- mid to distal LCX 1/20/21 (following surgical turndown for CABG in the setting of COVID19 Infection (+PCR 1/13/21)), staged PCI to RCA, LAD and LCx stents widely patent by [facility] 3/17/21  Ischemic cardiomyopathy (LVEF 40-45%)  Hypertension, controlled today (122/84 today)  Hyperlipidemia, controlled (1/2021:   HDL 41 LDL 69)  CHELSEY  Obesity  Former tobacco use    Continue ASA/Brilinta for at least 12 months. (Preferably March 2022)  Continue statin, beta blocker, ACEI. Rafi prefers to simulate cardiac rehab at home. Remains without angina or signs of congestion. Echocardiogram at three month follow up. Patient Instructions   Stop Metoprolol Tartrate (Lopressor)    Start taking Metoprolol Succinate (Toprol) 75 mg nightly    Call us if you have any concerns or questions    Follow up in 3 months with an echocardiogram the same day    Orders Placed This Encounter   Procedures    Echo 2D w doppler w color complete     Standing Status:   Future     Standing Expiration Date:   3/23/2022     Order Specific Question:   Reason for exam:     Answer:   icm, cad, htn, hx covid 19         Thank you for allowing me to participate in the care of your patient. Please do not hesitate to call. Rusty Morgan DO, [address] [facility]  Interventional Cardiology     o: [phone]  [address], [address] [facility], [address]      NOTE:  This report was transcribed using voice recognition software. Every effort was made to ensure accuracy; however, inadvertent computerized transcription errors may be present. Scribe's Attestation:  This note was scribed in the presence of Dr. Eren Rg DO by Evelyn Cali RN.    I, Ifrah Malhotra, have personally performed the services described in this documentation as scribed by Silvino Banda RN in my presence, and it is both accurate and complete. An electronic signature was used to authenticate this note.            Sincerely,        Kallie Mendiola DO

## 2021-06-16 NOTE — PROGRESS NOTES
21    PATIENT: Tami Vidal  : 1960    Primary Care Provider:   Ted Dyer MD  21 [phone]    Reason for evaluation:   Chief Complaint   Patient presents with    Coronary Artery Disease     no cardiac complaints at this time    3 Month Follow-Up     History of present illness:   Mr. Tami Vidal is a 61 y.o. male patient here in 3 month cardiovascular follow up regarding CAD, ischemic cardiomyopathy, hyperlipidemia, and hypertension. Rafi first became known to me after presenting to the ER in the setting of a STEMI 21 with complaints of \"crushing\" chest pain and diaphoresis while cleaning his deck to sell his house. He ultimately underwent complex multivessel stenting 21 after surgical turndown in the setting of COVID-19 infection. He and his wife have moved into their new home and he states that he has returned to baseline. He is cutting 24 lawns and states that while it is a riding mower he he gets significant steps with weed whacking as well as golfing cart path only. He denies chest pain that he states he would identify well with his return to activity. He states that he has lost 15 pounds since last visit as he has cut out regular soda. No shortness of breath PND orthopnea lower extremity edema. Looking forward to family a great vacation with his 8 grandchildren to [facility].      Medical History:      Diagnosis Date    CAD (coronary artery disease)     Hypertension     STEMI (ST elevation myocardial infarction) ([address] 2021       Surgical History:      Procedure Laterality Date    CORONARY ANGIOPLASTY WITH STENT PLACEMENT      ELBOW SURGERY      rt elbow       Social History:  Social History     Socioeconomic History    Marital status:      Spouse name: Not on file    Number of children: Not on file    Years of education: Not on file    Highest education level: Not on file   Occupational History    Not on file   Tobacco Use    Smoking status: Former Smoker     Packs/day: 1.00     Years: 25.00     Pack years: 25.00     Types: Cigarettes     Quit date: 2010     Years since quittin.4    Smokeless tobacco: Never Used   Vaping Use    Vaping Use: Never used   Substance and Sexual Activity    Alcohol use: Yes     Alcohol/week: 2.0 - 4.0 standard drinks     Types: 2 - 4 Cans of beer per week     Comment: occasional    Drug use: Never    Sexual activity: Not on file   Other Topics Concern    Not on file   Social History Narrative    Not on file     Social Determinants of Health     Financial Resource Strain:     Difficulty of Paying Living Expenses:    Food Insecurity:     Worried About Running Out of Food in the Last Year:     [address] in the Last Year:    Transportation Needs:     Lack of Transportation (Medical):  Lack of Transportation (Non-Medical):    Physical Activity:     Days of Exercise per Week:     Minutes of Exercise per Session:    Stress:     Feeling of Stress :    Social Connections:     Frequency of Communication with Friends and Family:     Frequency of Social Gatherings with Friends and Family:     Attends Church Services:     Active Member of Clubs or Organizations:     Attends Club or Organization Meetings:     Marital Status:    Intimate Partner Violence:     Fear of Current or Ex-Partner:     Emotionally Abused:     Physically Abused:     Sexually Abused:         Family History:  No evidence for sudden cardiac death or premature CAD. Problem Relation Age of Onset    Cancer Mother     Kidney Disease Father        Medications:  [x] Medications and dosages reviewed. Prior to Admission medications    Medication Sig Start Date End Date Taking?  Authorizing Provider   metoprolol succinate (TOPROL XL) 50 MG extended release tablet Take 1.5 tablets by mouth nightly 3/23/21  Yes Arben Minor,    carbidopa-levodopa (SINEMET)  MG per tablet Take 1 tablet by mouth every evening   Yes Historical Provider, MD   atorvastatin (LIPITOR) 80 MG tablet Take 1 tablet by mouth nightly 2/3/21  Yes Cutler Just, DO   lisinopril (PRINIVIL;ZESTRIL) 5 MG tablet Take 0.5 tablets by mouth 2 times daily 2/3/21  Yes Carlos Just, DO   ticagrelor (BRILINTA) 90 MG TABS tablet Take 1 tablet by mouth 2 times daily 2/3/21  Yes Cutler Just, DO   lansoprazole (PREVACID 24HR) 15 MG delayed release capsule Take 15 mg by mouth daily   Yes Historical Provider, MD   aspirin 81 MG EC tablet Take 1 tablet by mouth daily 1/22/21  Yes Cutler Just, DO   methocarbamol (ROBAXIN) 500 MG tablet Take 500 mg by mouth daily  11/21/14  Yes Historical Provider, MD       Allergies:  Patient has no known allergies.      Review of Systems:    [x]Full ROS obtained and negative except as mentioned in HPI    Physical Examination:    BP (!) 140/94 (Site: Left Upper Arm, Position: Sitting, Cuff Size: Large Adult)   Pulse 63   Ht 5' 6\" (1.676 m)   Wt 203 lb (92.1 kg)   SpO2 99%   BMI 32.77 kg/m²   Wt Readings from Last 3 Encounters:   06/22/21 203 lb (92.1 kg)   03/23/21 215 lb 1.6 oz (97.6 kg)   03/17/21 210 lb (95.3 kg)     Vitals:    06/22/21 0859   BP: (!) 140/94   Pulse:    SpO2:        · GENERAL: Well developed, well nourished, no acute distress  · NEUROLOGICAL: Alert and oriented x3  · PSYCH: Normal mood and affect   · SKIN: Warm and dry  · HEENT: Normocephalic, atraumatic, Sclera non-icteric, mucous membranes moist  · NECK: supple, JVP normal  · CARDIAC: Normal PMI, regular rate and rhythm, normal S1S2, no murmur, rub, or gallop  · RESPIRATORY: Normal respiratory effort, clear to auscultation bilaterally  · EXTREMITIES: no edema or clubbing, +2 pulses bilaterally   · MUSCULOSKELETAL: No joint swelling or tenderness, no chest wall tenderness  · GASTROINTESTINAL: normal bowel sounds, soft, non-tender    Imaging:  I have reviewed the below testing personally:  ECHO 1/14/21 Summary   Overall, left ventricular systolic function is mildly depressed. Ejection fraction is visually estimated to be 40-45%. Mild mitral regurgitation. The right ventricle is normal in size and function. 1/13/21  Left Heart Cath  Dominance: Right       LM: 40% eccentric distal stenosis   LAD: 90% ostial to proximal stenosis; 90% mid in stent restenosis , jailed second diagonal with 30% ostial narrowing ; 100% mid stent thrombosis at distal edge   LCx: diffuse disease with 90% mid and distal stenoses, with severe disease extending into proximal OM2   RCA: 70% proximal and 85% mid stenoses; RPLB and RPDA free of significant disease      LVEDP: 17 mmHg   LVEF: 25-30%      6 Fr XB 3.5 Guide   BMW wire to apical LAD with ease  2.5 x 12 mm Trek balloon to culprit occlusion   Restoration of PAULETTE III flow to wrap around apex        1/20/21  Cardiac Catheterization  Procedure: LHC, IVUS-guided PCI proximal to distal LAD, distal LM, PCI- mid to distal LCX    Findings:     LVEDP: 10 mmHg    6 Fr XB 3.5 Guide     Please see logging for predil and postdil details      Ultimately, 2.5 x 38 mm Xience NAZARIO, 3.5 x 38 mm Xience NAZARIO, and 4.0 x 12 mm Xience NAZARIO overlapping  from distal LAD to distal LM. 2.75 x 18 mm Xience NAZARIO to mid LCX with 2.25 x 18 mm Xience NAZARIO overlapping distally. 0% residual LM, LAD, and mid to distal LCX. Residual 70% ostial ramus and 40% ostial LCX. Severe, diffusely diseased smaller second and third marginals to be managed medically. 3/17/21  Left Heart Cath  Dominance: Right       LM: distal stent patent  LAD: proximal mid and distal stents widely patent  RI: smaller vessel with 70% ostial stenosis  LCx: 40% ostial disease prior to patent proximal and mid stents. Severe, diffusely diseased smaller second and third marginals.   RCA: 70% proximal, 85% mid, 70% distal stenoses; RPLB and RPDA free of significant disease      LVEDP: 16 mmHg      Ultimately, AR1 guide with Guideliner to deliver   3.5 x 12 mm Xience NAZARIO proximal overlapping 3.25 x28 mm Xience NAZARIO mid     3.0 x 15 mm Xience NAZARIO overlapping 2.75 x 15 mm Xience NAZARIO distal      0% residual     6/22/21 TTE   Overall, left ventricular systolic function is mildly depressed. Ejection fraction is visually estimated to be 45-50%. No regional wall motion abnormalities are noted. Diastolic filling parameters suggests grade II diastolic dysfunction . Mild mitral regurgitation. Normal right ventricular size and function. Trivial tricuspid regurgitation. RVSP = 24 mmHg. Impression/Recommendations    Mr. Oseas Lane is a 61 y.o. male patient with:    CAD; POBA mid LAD 1/13/21 in the setting of STEMI, IVUS-guided PCI proximal to distal LAD, distal LM, PCI- mid to distal LCX 1/20/21 (following surgical turndown for CABG in the setting of COVID19 Infection (+PCR 1/13/21)), staged PCI to RCA, LAD and LCx stents widely patent by [employer] 3/17/21  Ischemic cardiomyopathy (LVEF 45-50%, mild MR by today's echo)  Hypertension, controlled  Hyperlipidemia, controlled (1/2021:   HDL 41 LDL 69)  CHELSEY  Obesity  Former tobacco use      Rafi has returned to his baseline functional status with full-time work and activities as above. He is without angina. He has had improvement in ejection fraction on echo today in the office to 45 to 50%. No overt signs of heart failure. Discussed continuing regimen of long-term dual antiplatelet therapy with Aspirin and Brilinta. No change to statin, beta blocker, ACEI. Encouraged continued lifestyle changes that have led to weight loss.        Patient Instructions   Continue current medication regimen   FYI: we will plan to reduce Brilinta dosing at the 1 year jayy following stent placement (March 2022)  We will discuss this at your next follow up    Let us know if you notice elevated blood pressures at home    Call with any questions or concerns  Follow up in 6 months    Thank you for allowing me to participate in the care of your patient. Please do not hesitate to call. Isabel Gonzalez DO, [facility]  Interventional Cardiology     o: [phone]  [address] [facility], [address]      NOTE:  This report was transcribed using voice recognition software. Every effort was made to ensure accuracy; however, inadvertent computerized transcription errors may be present. Scribe's Attestation: This note was scribed in the presence of Dr. Nestor Coles DO by David Ross RN.    I, Isabel Gonzalez, have personally performed the services described in this documentation as scribed by Karina Arnold. AARON Banda in my presence, and it is both accurate and complete. An electronic signature was used to authenticate this note.

## 2021-06-22 ENCOUNTER — HOSPITAL ENCOUNTER (OUTPATIENT)
Dept: NON INVASIVE DIAGNOSTICS | Age: 61
Discharge: HOME | End: 2021-06-22
Payer: COMMERCIAL

## 2021-06-22 ENCOUNTER — OFFICE VISIT (OUTPATIENT)
Dept: CARDIOLOGY CLINIC | Age: 61
End: 2021-06-22
Payer: COMMERCIAL

## 2021-06-22 VITALS
HEIGHT: 66 IN | BODY MASS INDEX: 32.62 KG/M2 | HEART RATE: 63 BPM | SYSTOLIC BLOOD PRESSURE: 140 MMHG | WEIGHT: 203 LBS | OXYGEN SATURATION: 99 % | DIASTOLIC BLOOD PRESSURE: 94 MMHG

## 2021-06-22 DIAGNOSIS — U07.1 COVID-19: ICD-10-CM

## 2021-06-22 DIAGNOSIS — I25.10 CORONARY ARTERY DISEASE INVOLVING NATIVE CORONARY ARTERY OF NATIVE HEART WITHOUT ANGINA PECTORIS: ICD-10-CM

## 2021-06-22 DIAGNOSIS — I10 ESSENTIAL HYPERTENSION: ICD-10-CM

## 2021-06-22 DIAGNOSIS — I25.5 ISCHEMIC CARDIOMYOPATHY: ICD-10-CM

## 2021-06-22 DIAGNOSIS — E78.5 DYSLIPIDEMIA: ICD-10-CM

## 2021-06-22 DIAGNOSIS — Z87.891 EX-SMOKER: ICD-10-CM

## 2021-06-22 DIAGNOSIS — I25.10 CORONARY ARTERY DISEASE INVOLVING NATIVE CORONARY ARTERY OF NATIVE HEART WITHOUT ANGINA PECTORIS: Primary | ICD-10-CM

## 2021-06-22 LAB
LV EF: 48 %
LVEF MODALITY: NORMAL

## 2021-06-22 PROCEDURE — 93306 TTE W/DOPPLER COMPLETE: CPT

## 2021-06-22 PROCEDURE — 99214 OFFICE O/P EST MOD 30 MIN: CPT | Performed by: INTERNAL MEDICINE

## 2021-06-22 NOTE — PATIENT INSTRUCTIONS
Continue current medication regimen   FYI: we will plan to reduce Brilinta dosing at the 1 year jayy following stent placement (March 2022)  We will discuss this at your next follow up    Let us know if you notice elevated blood pressures at home    Call with any questions or concerns  Follow up in 6 months

## 2021-06-22 NOTE — LETTER
[address] [address] [address] [address]  Phone: [phone]  Fax: [phone]           Priyank Stafford,       2021     Patient: Alexa Lane   MR Number: [id number]   YOB: 1960   Date of Visit: 2021       Dear Dr. Amrik Crockett ref. provider found:                                        21    PATIENT: Alexa Lane  : 1960    Primary Care Provider:   Latisha Barry MD  21 [phone]    Reason for evaluation:   Chief Complaint   Patient presents with    Coronary Artery Disease     no cardiac complaints at this time    3 Month Follow-Up     History of present illness:   Mr. Alexa Lane is a 61 y.o. male patient here in 3 month cardiovascular follow up regarding CAD, ischemic cardiomyopathy, hyperlipidemia, and hypertension. Rafi first became known to me after presenting to the ER in the setting of a STEMI 21 with complaints of \"crushing\" chest pain and diaphoresis while cleaning his deck to sell his house. He ultimately underwent complex multivessel stenting 21 after surgical turndown in the setting of COVID-19 infection. He and his wife have moved into their new home and he states that he has returned to baseline. He is cutting 24 lawns and states that while it is a riding mower he he gets significant steps with weed whacking as well as golfing cart path only. He denies chest pain that he states he would identify well with his return to activity. He states that he has lost 15 pounds since last visit as he has cut out regular soda. No shortness of breath PND orthopnea lower extremity edema. Looking forward to family a great vacation with his 8 grandchildren to [facility].      Medical History:      Diagnosis Date    CAD (coronary artery disease)     Hypertension     STEMI (ST elevation myocardial infarction) ([facility] [address] 2021       Surgical History:      Procedure Laterality Date    CORONARY ANGIOPLASTY WITH STENT PLACEMENT      ELBOW SURGERY      rt elbow       Social History:  Social History     Socioeconomic History    Marital status:      Spouse name: Not on file    Number of children: Not on file    Years of education: Not on file    Highest education level: Not on file   Occupational History    Not on file   Tobacco Use    Smoking status: Former Smoker     Packs/day: 1.00     Years: 25.00     Pack years: 25.00     Types: Cigarettes     Quit date: 2010     Years since quittin.4    Smokeless tobacco: Never Used   Vaping Use    Vaping Use: Never used   Substance and Sexual Activity    Alcohol use: Yes     Alcohol/week: 2.0 - 4.0 standard drinks     Types: 2 - 4 Cans of beer per week     Comment: occasional    Drug use: Never    Sexual activity: Not on file   Other Topics Concern    Not on file   Social History Narrative    Not on file     Social Determinants of Health     Financial Resource Strain:     Difficulty of Paying Living Expenses:    Food Insecurity:     Worried About Running Out of Food in the Last Year:     [address] in the Last Year:    Transportation Needs:     Lack of Transportation (Medical):  Lack of Transportation (Non-Medical):    Physical Activity:     Days of Exercise per Week:     Minutes of Exercise per Session:    Stress:     Feeling of Stress :    Social Connections:     Frequency of Communication with Friends and Family:     Frequency of Social Gatherings with Friends and Family:     Attends [employer] Services:     Active Member of Clubs or Organizations:     Attends Club or Organization Meetings:     Marital Status:    Intimate Partner Violence:     Fear of Current or Ex-Partner:     Emotionally Abused:     Physically Abused:     Sexually Abused:         Family History:  No evidence for sudden cardiac death or premature CAD.       Problem Relation Age of Onset    Cancer Mother     Kidney Disease Father Medications:  [x] Medications and dosages reviewed. Prior to Admission medications    Medication Sig Start Date End Date Taking? Authorizing Provider   metoprolol succinate (TOPROL XL) 50 MG extended release tablet Take 1.5 tablets by mouth nightly 3/23/21  Yes Dione Monterroso DO   carbidopa-levodopa (SINEMET)  MG per tablet Take 1 tablet by mouth every evening   Yes Historical Provider, MD   atorvastatin (LIPITOR) 80 MG tablet Take 1 tablet by mouth nightly 2/3/21  Yes Dione Monterroso DO   lisinopril (PRINIVIL;ZESTRIL) 5 MG tablet Take 0.5 tablets by mouth 2 times daily 2/3/21  Yes Dione Monterroso DO   ticagrelor (BRILINTA) 90 MG TABS tablet Take 1 tablet by mouth 2 times daily 2/3/21  Yes Dione Monterroso DO   lansoprazole (PREVACID 24HR) 15 MG delayed release capsule Take 15 mg by mouth daily   Yes Historical Provider, MD   aspirin 81 MG EC tablet Take 1 tablet by mouth daily 1/22/21  Yes Dione Monterroso DO   methocarbamol (ROBAXIN) 500 MG tablet Take 500 mg by mouth daily  11/21/14  Yes Historical Provider, MD       Allergies:  Patient has no known allergies.      Review of Systems:    [x]Full ROS obtained and negative except as mentioned in HPI    Physical Examination:    BP (!) 140/94 (Site: Left Upper Arm, Position: Sitting, Cuff Size: Large Adult)   Pulse 63   Ht 5' 6\" (1.676 m)   Wt 203 lb (92.1 kg)   SpO2 99%   BMI 32.77 kg/m²   Wt Readings from Last 3 Encounters:   06/22/21 203 lb (92.1 kg)   03/23/21 215 lb 1.6 oz (97.6 kg)   03/17/21 210 lb (95.3 kg)     Vitals:    06/22/21 0859   BP: (!) 140/94   Pulse:    SpO2:        · GENERAL: Well developed, well nourished, no acute distress  · NEUROLOGICAL: Alert and oriented x3  · PSYCH: Normal mood and affect   · SKIN: Warm and dry  · HEENT: Normocephalic, atraumatic, Sclera non-icteric, mucous membranes moist  · NECK: supple, JVP normal  · CARDIAC: Normal PMI, regular rate and rhythm, normal S1S2, no murmur, rub, or gallop  · RESPIRATORY: Normal respiratory effort, clear to auscultation bilaterally  · EXTREMITIES: no edema or clubbing, +2 pulses bilaterally   · MUSCULOSKELETAL: No joint swelling or tenderness, no chest wall tenderness  · GASTROINTESTINAL: normal bowel sounds, soft, non-tender    Imaging:  I have reviewed the below testing personally:  ECHO 1/14/21   Summary   Overall, left ventricular systolic function is mildly depressed. Ejection fraction is visually estimated to be 40-45%. Mild mitral regurgitation. The right ventricle is normal in size and function. 1/13/21  Left Heart Cath  Dominance: Right       LM: 40% eccentric distal stenosis   LAD: 90% ostial to proximal stenosis; 90% mid in stent restenosis , jailed second diagonal with 30% ostial narrowing ; 100% mid stent thrombosis at distal edge   LCx: diffuse disease with 90% mid and distal stenoses, with severe disease extending into proximal OM2   RCA: 70% proximal and 85% mid stenoses; RPLB and RPDA free of significant disease      LVEDP: 17 mmHg   LVEF: 25-30%      6 Fr XB 3.5 Guide   BMW wire to apical LAD with ease  2.5 x 12 mm Trek balloon to culprit occlusion   Restoration of PAULETTE III flow to wrap around apex        1/20/21  Cardiac Catheterization  Procedure: LHC, IVUS-guided PCI proximal to distal LAD, distal LM, PCI- mid to distal LCX    Findings:     LVEDP: 10 mmHg    6 Fr XB 3.5 Guide     Please see logging for predil and postdil details      Ultimately, 2.5 x 38 mm Xience NAZARIO, 3.5 x 38 mm Xience NAZARIO, and 4.0 x 12 mm Xience NAZARIO overlapping  from distal LAD to distal LM. 2.75 x 18 mm Xience NAZARIO to mid LCX with 2.25 x 18 mm Xience NAZARIO overlapping distally. 0% residual LM, LAD, and mid to distal LCX. Residual 70% ostial ramus and 40% ostial LCX. Severe, diffusely diseased smaller second and third marginals to be managed medically.      3/17/21  Left Heart Cath  Dominance: Right       LM: distal stent patent  LAD: proximal mid and distal stents widely patent  RI: smaller vessel with 70% ostial stenosis  LCx: 40% ostial disease prior to patent proximal and mid stents. Severe, diffusely diseased smaller second and third marginals. RCA: 70% proximal, 85% mid, 70% distal stenoses; RPLB and RPDA free of significant disease      LVEDP: 16 mmHg      Ultimately, AR1 guide with Guideliner to deliver   3.5 x 12 mm Xience NAZAIRO proximal overlapping 3.25 x28 mm Xience NAZARIO mid     3.0 x 15 mm Xience NAZARIO overlapping 2.75 x 15 mm Xience NAZARIO distal      0% residual     6/22/21 TTE   Overall, left ventricular systolic function is mildly depressed. Ejection fraction is visually estimated to be 45-50%. No regional wall motion abnormalities are noted. Diastolic filling parameters suggests grade II diastolic dysfunction . Mild mitral regurgitation. Normal right ventricular size and function. Trivial tricuspid regurgitation. RVSP = 24 mmHg. Impression/Recommendations    Mr. Kalyn Valentin is a 61 y.o. male patient with:    CAD; POBA mid LAD 1/13/21 in the setting of STEMI, IVUS-guided PCI proximal to distal LAD, distal LM, PCI- mid to distal LCX 1/20/21 (following surgical turndown for CABG in the setting of COVID19 Infection (+PCR 1/13/21)), staged PCI to RCA, LAD and LCx stents widely patent by [facility] 3/17/21  Ischemic cardiomyopathy (LVEF 45-50%, mild MR by today's echo)  Hypertension, controlled  Hyperlipidemia, controlled (1/2021:   HDL 41 LDL 69)  CHELSEY  Obesity  Former tobacco use      Rafi has returned to his baseline functional status with full-time work and activities as above. He is without angina. He has had improvement in ejection fraction on echo today in the office to 45 to 50%. No overt signs of heart failure. Discussed continuing regimen of long-term dual antiplatelet therapy with Aspirin and Brilinta. No change to statin, beta blocker, ACEI. Encouraged continued lifestyle changes that have led to weight loss.        Patient Instructions Continue current medication regimen   FYI: we will plan to reduce Brilinta dosing at the 1 year jayy following stent placement (March 2022)  We will discuss this at your next follow up    Let us know if you notice elevated blood pressures at home    Call with any questions or concerns  Follow up in 6 months    Thank you for allowing me to participate in the care of your patient. Please do not hesitate to call. Daryle Font, DO, [facility]  Interventional Cardiology     o: [phone]  [address] [place] [address]., [address] [facility], [address] [place] [address]      NOTE:  This report was transcribed using voice recognition software. Every effort was made to ensure accuracy; however, inadvertent computerized transcription errors may be present. Scribe's Attestation: This note was scribed in the presence of Dr. Wayne Carrillo DO by Barbara Campbell RN.    I, Daryle Font, have personally performed the services described in this documentation as scribed by Ingrid Berger. AARON Banda in my presence, and it is both accurate and complete. An electronic signature was used to authenticate this note.          Sincerely,    AARON Post DO    CC providers:  Rachna Estevez MD  [facility] [address] [facility] [address]  Via Fax: [phone]

## 2021-11-22 NOTE — PROGRESS NOTES
Years since quittin.9    Smokeless tobacco: Never Used   Vaping Use    Vaping Use: Never used   Substance and Sexual Activity    Alcohol use: Yes     Alcohol/week: 2.0 - 4.0 standard drinks     Types: 2 - 4 Cans of beer per week     Comment: occasional    Drug use: Never    Sexual activity: Not on file   Other Topics Concern    Not on file   Social History Narrative    Not on file     Social Determinants of Health     Financial Resource Strain:     Difficulty of Paying Living Expenses: Not on file   Food Insecurity:     Worried About Running Out of Food in the Last Year: Not on file    Praful of Food in the Last Year: Not on file   Transportation Needs:     Lack of Transportation (Medical): Not on file    Lack of Transportation (Non-Medical): Not on file   Physical Activity:     Days of Exercise per Week: Not on file    Minutes of Exercise per Session: Not on file   Stress:     Feeling of Stress : Not on file   Social Connections:     Frequency of Communication with Friends and Family: Not on file    Frequency of Social Gatherings with Friends and Family: Not on file    Attends Spiritism Services: Not on file    Active Member of [address] [place] [address] [employer] or Organizations: Not on file    Attends Club or Organization Meetings: Not on file    Marital Status: Not on file   Intimate Partner Violence:     Fear of Current or Ex-Partner: Not on file    Emotionally Abused: Not on file    Physically Abused: Not on file    Sexually Abused: Not on file   Housing Stability:     Unable to Pay for Housing in the Last Year: Not on file    Number of [place] in the Last Year: Not on file    Unstable Housing in the Last Year: Not on file        Family History:  No evidence for sudden cardiac death or premature CAD. Problem Relation Age of Onset    Cancer Mother     Kidney Disease Father        Medications:  [x] Medications and dosages reviewed.   Prior to Admission medications    Medication Sig Start Date End Date Taking? Authorizing Provider   lisinopril (PRINIVIL;ZESTRIL) 5 MG tablet Take 0.5 tablets by mouth 2 times daily  Patient taking differently: Take 5 mg by mouth daily  11/30/21  Yes Winnie Sarkar DO   atorvastatin (LIPITOR) 80 MG tablet Take 1 tablet by mouth nightly 11/30/21  Yes Winnie Sarkar DO   metoprolol succinate (TOPROL XL) 50 MG extended release tablet Take 1 tablet by mouth 2 times daily 11/30/21  Yes Winnie Sarkar DO   carbidopa-levodopa (SINEMET)  MG per tablet Take 1 tablet by mouth every evening   Yes Historical Provider, MD   ticagrelor (BRILINTA) 90 MG TABS tablet Take 1 tablet by mouth 2 times daily 2/3/21  Yes Winnie Sarkar DO   lansoprazole (PREVACID 24HR) 15 MG delayed release capsule Take 15 mg by mouth daily   Yes Historical Provider, MD   aspirin 81 MG EC tablet Take 1 tablet by mouth daily 1/22/21  Yes Winnie Sarkar DO   methocarbamol (ROBAXIN) 500 MG tablet Take 500 mg by mouth daily  11/21/14  Yes Historical Provider, MD       Allergies:  Patient has no known allergies.      Review of Systems:    [x]Full ROS obtained and negative except as mentioned in HPI    Physical Examination:    BP (!) 140/94 (Site: Right Upper Arm, Position: Sitting, Cuff Size: Medium Adult)   Pulse 69   Ht 5' 6\" (1.676 m)   Wt 200 lb 14.4 oz (91.1 kg)   SpO2 97%   BMI 32.43 kg/m²   Wt Readings from Last 3 Encounters:   11/30/21 200 lb 14.4 oz (91.1 kg)   06/22/21 203 lb (92.1 kg)   03/23/21 215 lb 1.6 oz (97.6 kg)     Vitals:    11/30/21 0804   BP: (!) 140/94   Pulse:    SpO2:        · GENERAL: Well developed, well nourished, no acute distress  · NEUROLOGICAL: Alert and oriented x3  · PSYCH: Normal mood and affect   · SKIN: Warm and dry  · HEENT: Normocephalic, atraumatic, Sclera non-icteric, mucous membranes moist  · NECK: supple, JVP normal  · CARDIAC: Normal PMI, regular rate and rhythm, normal S1S2, no murmur, rub, or gallop  · RESPIRATORY: Normal respiratory effort, clear to auscultation bilaterally  · EXTREMITIES: no edema or clubbing, +2 pulses bilaterally   · MUSCULOSKELETAL: No joint swelling or tenderness, no chest wall tenderness  · GASTROINTESTINAL: normal bowel sounds, soft, non-tender    Imaging:  I have reviewed the below testing personally:    1/13/21  Left Heart Cath  Dominance: Right       LM: 40% eccentric distal stenosis   LAD: 90% ostial to proximal stenosis; 90% mid in stent restenosis , jailed second diagonal with 30% ostial narrowing ; 100% mid stent thrombosis at distal edge   LCx: diffuse disease with 90% mid and distal stenoses, with severe disease extending into proximal OM2   RCA: 70% proximal and 85% mid stenoses; RPLB and RPDA free of significant disease      LVEDP: 17 mmHg   LVEF: 25-30%      6 Fr XB 3.5 Guide   BMW wire to apical LAD with ease  2.5 x 12 mm Trek balloon to culprit occlusion   Restoration of PAULETTE III flow to wrap around apex     1/20/21  Cardiac Catheterization  Procedure: [facility], IVUS-guided PCI proximal to distal LAD, distal LM, PCI- mid to distal LCX    Findings:     LVEDP: 10 mmHg    6 Fr XB 3.5 Guide     Please see logging for predil and postdil details      Ultimately, 2.5 x 38 mm Xience NAZARIO, 3.5 x 38 mm Xience NAZARIO, and 4.0 x 12 mm Xience NAZARIO overlapping  from distal LAD to distal LM. 2.75 x 18 mm Xience NAZARIO to mid LCX with 2.25 x 18 mm Xience NAZARIO overlapping distally. 0% residual LM, LAD, and mid to distal LCX. Residual 70% ostial ramus and 40% ostial LCX. Severe, diffusely diseased smaller second and third marginals to be managed medically. 3/17/21  Left Heart Cath  Dominance: Right       LM: distal stent patent  LAD: proximal mid and distal stents widely patent  RI: smaller vessel with 70% ostial stenosis  LCx: 40% ostial disease prior to patent proximal and mid stents. Severe, diffusely diseased smaller second and third marginals.   RCA: 70% proximal, 85% mid, 70% distal stenoses; RPLB and RPDA free of significant disease LVEDP: 16 mmHg      Ultimately, AR1 guide with Guideliner to deliver   3.5 x 12 mm Xience NAZARIO proximal overlapping 3.25 x28 mm Xience NAZARIO mid     3.0 x 15 mm Xience NAZARIO overlapping 2.75 x 15 mm Xience NAZARIO distal      0% residual    1/14/21 TTE   Summary   Overall, left ventricular systolic function is mildly depressed. Ejection fraction is visually estimated to be 40-45%. Mild mitral regurgitation. The right ventricle is normal in size and function. 6/22/21 TTE   Summary   Overall, left ventricular systolic function is mildly depressed. Ejection fraction is visually estimated to be 45-50%. No regional wall motion abnormalities are noted. Diastolic filling parameters suggests grade II diastolic dysfunction . Mild mitral regurgitation. Normal right ventricular size and function. Trivial tricuspid regurgitation. RVSP = 24 mmHg. Impression/Recommendations    Mr. Mack Hudson is a 64 y.o. male patient with:    CAD: POBA mid LAD 1/13/21 in the setting of STEMI, IVUS-guided PCI proximal to distal LAD, distal LM, PCI- mid to distal LCX 1/20/21 (following surgical turndown for CABG in the setting of COVID19 Infection (+PCR 1/13/21)), staged PCI to RCA 3/17/21  Ischemic cardiomyopathy, recovered (LVEF 45-50%, mild MR by 6/2021 TTE)  Hypertension, stable  Hyperlipidemia, controlled to LDL target <70 (1/2021:   HDL 41 LDL 69)  CHELSEY  Obesity  Former tobacco use      Rafi has returned to functional baseline as above and is without concern since most recent staged coronary stenting completed his revascularization in March 2021. Discussed below details prior to four month follow up.      ASA 81 mg long term  Brilinta 90 mg bid --> 60 mg bid March 2022   Atorvastatin 80 mg with lipids due in January 2022   Metoprolol succinate 75 -->100 mg   Lisinopril 5 mg         Patient Instructions   Change Metoprolol Succinate to 50 mg twice daily    OK to continue Lisinopril at 5 mg once daily    Fasting blood work to check cholesterol due in January (orders placed, you can do this after [place] if you would like)    Follow up in March 2022 and we will discuss decreasing Brilinta dosing at that time    We will discuss updating stress test at about 2 years or so out from your last stent       Orders Placed This Encounter   Procedures    COMPREHENSIVE METABOLIC PANEL     Standing Status:   Future     Standing Expiration Date:   11/30/2022    LIPID PANEL     Standing Status:   Future     Standing Expiration Date:   11/30/2022     Order Specific Question:   Is Patient Fasting?/# of Hours     Answer:   y/8    LIPID PANEL     Standing Status:   Future     Standing Expiration Date:   11/30/2022     Order Specific Question:   Is Patient Fasting?/# of Hours     Answer:   y8    AST     Standing Status:   Future     Standing Expiration Date:   11/30/2022    ALT     Standing Status:   Future     Standing Expiration Date:   11/30/2022     Thank you for allowing me to participate in the care of your patient. Please do not hesitate to call. Stephanie Rocha DO, [address] [facility]  Interventional Cardiology     o: [phone]  [address] [facility] [address], [address] [facility]      NOTE:  This report was transcribed using voice recognition software. Every effort was made to ensure accuracy; however, inadvertent computerized transcription errors may be present. Scribe's Attestation: This note was scribed in the presence of Dr. Cristiane Verma DO by Mirian Hunter RN.    I, Stephanie Rocha, have personally performed the services described in this documentation as scribed by Marjan Banda RN in my presence, and it is both accurate and complete. An electronic signature was used to authenticate this note.

## 2021-11-30 ENCOUNTER — OFFICE VISIT (OUTPATIENT)
Dept: CARDIOLOGY CLINIC | Age: 61
End: 2021-11-30
Payer: COMMERCIAL

## 2021-11-30 VITALS
DIASTOLIC BLOOD PRESSURE: 94 MMHG | HEIGHT: 66 IN | WEIGHT: 200.9 LBS | OXYGEN SATURATION: 97 % | BODY MASS INDEX: 32.29 KG/M2 | HEART RATE: 69 BPM | SYSTOLIC BLOOD PRESSURE: 140 MMHG

## 2021-11-30 DIAGNOSIS — I25.5 ISCHEMIC CARDIOMYOPATHY: ICD-10-CM

## 2021-11-30 DIAGNOSIS — I10 ESSENTIAL HYPERTENSION: ICD-10-CM

## 2021-11-30 DIAGNOSIS — I25.10 CORONARY ARTERY DISEASE DUE TO LIPID RICH PLAQUE: Primary | ICD-10-CM

## 2021-11-30 DIAGNOSIS — Z87.891 EX-SMOKER: ICD-10-CM

## 2021-11-30 DIAGNOSIS — I25.83 CORONARY ARTERY DISEASE DUE TO LIPID RICH PLAQUE: Primary | ICD-10-CM

## 2021-11-30 DIAGNOSIS — I25.10 CORONARY ARTERY DISEASE INVOLVING NATIVE CORONARY ARTERY OF NATIVE HEART WITHOUT ANGINA PECTORIS: ICD-10-CM

## 2021-11-30 DIAGNOSIS — E78.5 DYSLIPIDEMIA: ICD-10-CM

## 2021-11-30 PROCEDURE — 99214 OFFICE O/P EST MOD 30 MIN: CPT | Performed by: INTERNAL MEDICINE

## 2021-11-30 RX ORDER — LISINOPRIL 5 MG/1
2.5 TABLET ORAL 2 TIMES DAILY
Qty: 90 TABLET | Refills: 3 | Status: SHIPPED | OUTPATIENT
Start: 2021-11-30

## 2021-11-30 RX ORDER — METOPROLOL SUCCINATE 50 MG/1
50 TABLET, EXTENDED RELEASE ORAL 2 TIMES DAILY
Qty: 180 TABLET | Refills: 3 | Status: SHIPPED | OUTPATIENT
Start: 2021-11-30 | End: 2022-03-08

## 2021-11-30 RX ORDER — ATORVASTATIN CALCIUM 80 MG/1
80 TABLET, FILM COATED ORAL NIGHTLY
Qty: 90 TABLET | Refills: 3 | Status: SHIPPED | OUTPATIENT
Start: 2021-11-30

## 2021-11-30 NOTE — PATIENT INSTRUCTIONS
Change Metoprolol Succinate to 50 mg twice daily    OK to continue Lisinopril at 5 mg once daily    Fasting blood work to check cholesterol due in January (orders placed, you can do this after [place] if you would like)    Follow up in March 2022 and we will discuss decreasing Brilinta dosing at that time    We will discuss updating stress test at about 2 years or so out from your last stent

## 2021-11-30 NOTE — LETTER
 Number of Places Lived in the Last Year: Not on file    Unstable Housing in the Last Year: Not on file        Family History:  No evidence for sudden cardiac death or premature CAD. Problem Relation Age of Onset    Cancer Mother     Kidney Disease Father        Medications:  [x] Medications and dosages reviewed. Prior to Admission medications    Medication Sig Start Date End Date Taking? Authorizing Provider   lisinopril (PRINIVIL;ZESTRIL) 5 MG tablet Take 0.5 tablets by mouth 2 times daily  Patient taking differently: Take 5 mg by mouth daily  11/30/21  Yes Carlton Rossi DO   atorvastatin (LIPITOR) 80 MG tablet Take 1 tablet by mouth nightly 11/30/21  Yes Carlton Rossi DO   metoprolol succinate (TOPROL XL) 50 MG extended release tablet Take 1 tablet by mouth 2 times daily 11/30/21  Yes Carlton Rossi DO   carbidopa-levodopa (SINEMET)  MG per tablet Take 1 tablet by mouth every evening   Yes Historical Provider, MD   ticagrelor (BRILINTA) 90 MG TABS tablet Take 1 tablet by mouth 2 times daily 2/3/21  Yes Carlton Rossi DO   lansoprazole (PREVACID 24HR) 15 MG delayed release capsule Take 15 mg by mouth daily   Yes Historical Provider, MD   aspirin 81 MG EC tablet Take 1 tablet by mouth daily 1/22/21  Yes Carlton Rossi DO   methocarbamol (ROBAXIN) 500 MG tablet Take 500 mg by mouth daily  11/21/14  Yes Historical Provider, MD       Allergies:  Patient has no known allergies.      Review of Systems:    [x]Full ROS obtained and negative except as mentioned in HPI    Physical Examination:    BP (!) 140/94 (Site: Right Upper Arm, Position: Sitting, Cuff Size: Medium Adult)   Pulse 69   Ht 5' 6\" (1.676 m)   Wt 200 lb 14.4 oz (91.1 kg)   SpO2 97%   BMI 32.43 kg/m²   Wt Readings from Last 3 Encounters:   11/30/21 200 lb 14.4 oz (91.1 kg)   06/22/21 203 lb (92.1 kg)   03/23/21 215 lb 1.6 oz (97.6 kg)     Vitals:    11/30/21 0804   BP: (!) 140/94   Pulse:    SpO2:        · GENERAL: Well developed, well nourished, no acute distress  · NEUROLOGICAL: Alert and oriented x3  · PSYCH: Normal mood and affect   · SKIN: Warm and dry  · HEENT: Normocephalic, atraumatic, Sclera non-icteric, mucous membranes moist  · NECK: supple, JVP normal  · CARDIAC: Normal PMI, regular rate and rhythm, normal S1S2, no murmur, rub, or gallop  · RESPIRATORY: Normal respiratory effort, clear to auscultation bilaterally  · EXTREMITIES: no edema or clubbing, +2 pulses bilaterally   · MUSCULOSKELETAL: No joint swelling or tenderness, no chest wall tenderness  · GASTROINTESTINAL: normal bowel sounds, soft, non-tender    Imaging:  I have reviewed the below testing personally:    1/13/21  Left Heart Cath  Dominance: Right       LM: 40% eccentric distal stenosis   LAD: 90% ostial to proximal stenosis; 90% mid in stent restenosis , jailed second diagonal with 30% ostial narrowing ; 100% mid stent thrombosis at distal edge   LCx: diffuse disease with 90% mid and distal stenoses, with severe disease extending into proximal OM2   RCA: 70% proximal and 85% mid stenoses; RPLB and RPDA free of significant disease      LVEDP: 17 mmHg   LVEF: 25-30%      6 Fr XB 3.5 Guide   BMW wire to apical LAD with ease  2.5 x 12 mm Trek balloon to culprit occlusion   Restoration of PAULETTE III flow to wrap around apex     1/20/21  Cardiac Catheterization  Procedure: [facility], IVUS-guided PCI proximal to distal LAD, distal LM, PCI- mid to distal LCX    Findings:     LVEDP: 10 mmHg    6 Fr XB 3.5 Guide     Please see logging for predil and postdil details      Ultimately, 2.5 x 38 mm Xience NAZARIO, 3.5 x 38 mm Xience NAZARIO, and 4.0 x 12 mm Xience NAZARIO overlapping  from distal LAD to distal LM. 2.75 x 18 mm Xience NAZARIO to mid LCX with 2.25 x 18 mm Xience NAZARIO overlapping distally. 0% residual LM, LAD, and mid to distal LCX. Residual 70% ostial ramus and 40% ostial LCX. Severe, diffusely diseased smaller second and third marginals to be managed medically. 3/17/21  Left Heart Cath  Dominance: Right       LM: distal stent patent  LAD: proximal mid and distal stents widely patent  RI: smaller vessel with 70% ostial stenosis  LCx: 40% ostial disease prior to patent proximal and mid stents. Severe, diffusely diseased smaller second and third marginals. RCA: 70% proximal, 85% mid, 70% distal stenoses; RPLB and RPDA free of significant disease      LVEDP: 16 mmHg      Ultimately, AR1 guide with Guideliner to deliver   3.5 x 12 mm Xience NAZARIO proximal overlapping 3.25 x28 mm Xience NAZARIO mid     3.0 x 15 mm Xience NAZARIO overlapping 2.75 x 15 mm Xience NAZARIO distal      0% residual    1/14/21 TTE   Summary   Overall, left ventricular systolic function is mildly depressed. Ejection fraction is visually estimated to be 40-45%. Mild mitral regurgitation. The right ventricle is normal in size and function. 6/22/21 TTE   Summary   Overall, left ventricular systolic function is mildly depressed. Ejection fraction is visually estimated to be 45-50%. No regional wall motion abnormalities are noted. Diastolic filling parameters suggests grade II diastolic dysfunction . Mild mitral regurgitation. Normal right ventricular size and function. Trivial tricuspid regurgitation. RVSP = 24 mmHg.      Impression/Recommendations    Mr. Shannon Spann is a 64 y.o. male patient with:    CAD: POBA mid LAD 1/13/21 in the setting of STEMI, IVUS-guided PCI proximal to distal LAD, distal LM, PCI- mid to distal LCX 1/20/21 (following surgical turndown for CABG in the setting of COVID19 Infection (+PCR 1/13/21)), staged PCI to RCA 3/17/21  Ischemic cardiomyopathy, recovered (LVEF 45-50%, mild MR by 6/2021 TTE)  Hypertension, stable  Hyperlipidemia, controlled to LDL target <70 (1/2021:   HDL 41 LDL 69)  CHELSEY  Obesity  Former tobacco use      Rafi has returned to functional baseline as above and is without concern since most recent staged coronary stenting completed his revascularization in March 2021. Discussed below details prior to four month follow up. ASA 81 mg long term  Brilinta 90 mg bid --> 60 mg bid March 2022   Atorvastatin 80 mg with lipids due in January 2022   Metoprolol succinate 75 -->100 mg   Lisinopril 5 mg         Patient Instructions   Change Metoprolol Succinate to 50 mg twice daily    OK to continue Lisinopril at 5 mg once daily    Fasting blood work to check cholesterol due in January (orders placed, you can do this after [place] if you would like)    Follow up in March 2022 and we will discuss decreasing Brilinta dosing at that time    We will discuss updating stress test at about 2 years or so out from your last stent       Orders Placed This Encounter   Procedures    COMPREHENSIVE METABOLIC PANEL     Standing Status:   Future     Standing Expiration Date:   11/30/2022    LIPID PANEL     Standing Status:   Future     Standing Expiration Date:   11/30/2022     Order Specific Question:   Is Patient Fasting?/# of Hours     Answer:   y/8    LIPID PANEL     Standing Status:   Future     Standing Expiration Date:   11/30/2022     Order Specific Question:   Is Patient Fasting?/# of Hours     Answer:   y8    AST     Standing Status:   Future     Standing Expiration Date:   11/30/2022    ALT     Standing Status:   Future     Standing Expiration Date:   11/30/2022     Thank you for allowing me to participate in the care of your patient. Please do not hesitate to call. Jeniffer Mckeon DO, [facility]  Interventional Cardiology     o: [phone]  [address], [address] [facility], [address] [facility]      NOTE:  This report was transcribed using voice recognition software. Every effort was made to ensure accuracy; however, inadvertent computerized transcription errors may be present. Scribe's Attestation:  This note was scribed in the presence of Dr. Kena Soler DO by Cherelle Vernon RN.    I, Jeniffer Mckeon, have personally performed the services described in this documentation as scribed by Stalin Banda RN in my presence, and it is both accurate and complete. An electronic signature was used to authenticate this note. If you have questions, please do not hesitate to call me. I look forward to following Rafi along with you.     Sincerely,      Liliana Pink, DO

## 2022-03-05 NOTE — PROGRESS NOTES
3/8/2022    PATIENT: Ashley Smith  : 1960    Primary Care Provider:   Yajaira Cruz MD  21 [phone]    Reason for evaluation:   Chief Complaint   Patient presents with    Coronary Artery Disease     no cardiac symptoms at this time    3 Month Follow-Up     History of present illness:   Mr. Ashley Smith is a 64 y.o. male patient here in 6 month cardiovascular follow up regarding CAD, ischemic cardiomyopathy, hyperlipidemia, and hypertension. Rafi first became known to me after presenting to the ER in the setting of a STEMI 21 with complaints of well identified \"crushing\" chest pain and diaphoresis. He ultimately underwent complex multivessel stenting after surgical turndown in the setting of COVID-19 infection. He has tolerated his medication regimen well and denies bleeding issues. He is expecting his ninth grandchild this summer. He plans to continue his walking regimen ([employer] [facility]) as well as cutting lawns in the mornings. No recurrent angina. No shortness of breath palpitations lightheadedness or edema.      Medical History:      Diagnosis Date    CAD (coronary artery disease)     Hypertension     STEMI (ST elevation myocardial infarction) ([facility] [address] 2021       Surgical History:      Procedure Laterality Date    CORONARY ANGIOPLASTY WITH STENT PLACEMENT      ELBOW SURGERY      rt elbow       Social History:  Social History     Socioeconomic History    Marital status:      Spouse name: Not on file    Number of children: Not on file    Years of education: Not on file    Highest education level: Not on file   Occupational History    Not on file   Tobacco Use    Smoking status: Former Smoker     Packs/day: 1.00     Years: 25.00     Pack years: 25.00     Types: Cigarettes     Quit date: 2010     Years since quittin.1    Smokeless tobacco: Never Used   Vaping Use    Vaping Use: Never used Substance and Sexual Activity    Alcohol use: Yes     Alcohol/week: 2.0 - 4.0 standard drinks     Types: 2 - 4 Cans of beer per week     Comment: occasional    Drug use: Never    Sexual activity: Not on file   Other Topics Concern    Not on file   Social History Narrative    Not on file     Social Determinants of Health     Financial Resource Strain:     Difficulty of Paying Living Expenses: Not on file   Food Insecurity:     Worried About Running Out of Food in the Last Year: Not on file    Praful of Food in the Last Year: Not on file   Transportation Needs:     Lack of Transportation (Medical): Not on file    Lack of Transportation (Non-Medical): Not on file   Physical Activity:     Days of Exercise per Week: Not on file    Minutes of Exercise per Session: Not on file   Stress:     Feeling of Stress : Not on file   Social Connections:     Frequency of Communication with Friends and Family: Not on file    Frequency of Social Gatherings with Friends and Family: Not on file    Attends Jehovah's witness Services: Not on file    Active Member of [address] [place] [address] or Organizations: Not on file    Attends Club or Organization Meetings: Not on file    Marital Status: Not on file   Intimate Partner Violence:     Fear of Current or Ex-Partner: Not on file    Emotionally Abused: Not on file    Physically Abused: Not on file    Sexually Abused: Not on file   Housing Stability:     Unable to Pay for Housing in the Last Year: Not on file    Number of [place] in the Last Year: Not on file    Unstable Housing in the Last Year: Not on file        Family History:  No evidence for sudden cardiac death or premature CAD. Problem Relation Age of Onset    Cancer Mother     Kidney Disease Father        Medications:  [x] Medications and dosages reviewed. Prior to Admission medications    Medication Sig Start Date End Date Taking?  Authorizing Provider   ticagrelor (BRILINTA) 60 MG TABS tablet Take 1 tablet by mouth 2 times daily 3/8/22  Yes Lyubov García DO   metoprolol succinate (TOPROL XL) 100 MG extended release tablet Take 1 tablet by mouth at bedtime 3/8/22  Yes Lyubov García DO   lisinopril (PRINIVIL;ZESTRIL) 5 MG tablet Take 0.5 tablets by mouth 2 times daily  Patient taking differently: Take 5 mg by mouth daily  11/30/21  Yes Lyubov García DO   atorvastatin (LIPITOR) 80 MG tablet Take 1 tablet by mouth nightly 11/30/21  Yes Lyubov García DO   carbidopa-levodopa (SINEMET)  MG per tablet Take 1 tablet by mouth every evening   Yes Historical Provider, MD   lansoprazole (PREVACID 24HR) 15 MG delayed release capsule Take 15 mg by mouth daily   Yes Historical Provider, MD   aspirin 81 MG EC tablet Take 1 tablet by mouth daily 1/22/21  Yes Lyubov García DO   methocarbamol (ROBAXIN) 500 MG tablet Take 500 mg by mouth daily   Patient not taking: Reported on 3/8/2022 11/21/14   Historical Provider, MD       Allergies:  Patient has no known allergies.      Review of Systems:    [x]Full ROS obtained and negative except as mentioned in HPI    Physical Examination:    /80 (Site: Right Upper Arm, Position: Sitting, Cuff Size: Medium Adult)   Pulse 79   Ht 5' 6\" (1.676 m)   Wt 202 lb 8 oz (91.9 kg)   SpO2 98%   BMI 32.68 kg/m²   Wt Readings from Last 3 Encounters:   03/08/22 202 lb 8 oz (91.9 kg)   11/30/21 200 lb 14.4 oz (91.1 kg)   06/22/21 203 lb (92.1 kg)     Vitals:    03/08/22 1316   BP: 116/80   Pulse: 79   SpO2: 98%       · GENERAL: Well developed, well nourished, no acute distress  · NEUROLOGICAL: Alert and oriented x3  · PSYCH: Normal mood and affect   · SKIN: Warm and dry  · HEENT: Normocephalic, atraumatic, Sclera non-icteric, mucous membranes moist  · NECK: supple, JVP normal  · CARDIAC: Normal PMI, regular rate and rhythm, normal S1S2, no murmur, rub, or gallop  · RESPIRATORY: Normal respiratory effort, clear to auscultation bilaterally  · EXTREMITIES: no edema or clubbing, +2 pulses bilaterally   · MUSCULOSKELETAL: No joint swelling or tenderness, no chest wall tenderness  · GASTROINTESTINAL: normal bowel sounds, soft, non-tender    Imaging:  I have reviewed the below testing personally:    1/13/21  Left Heart Cath  Dominance: Right       LM: 40% eccentric distal stenosis   LAD: 90% ostial to proximal stenosis; 90% mid in stent restenosis , jailed second diagonal with 30% ostial narrowing ; 100% mid stent thrombosis at distal edge   LCx: diffuse disease with 90% mid and distal stenoses, with severe disease extending into proximal OM2   RCA: 70% proximal and 85% mid stenoses; RPLB and RPDA free of significant disease      LVEDP: 17 mmHg   LVEF: 25-30%      6 Fr XB 3.5 Guide   BMW wire to apical LAD with ease  2.5 x 12 mm Trek balloon to culprit occlusion   Restoration of PAULETTE III flow to wrap around apex     1/20/21  Cardiac Catheterization  Procedure: LHC, IVUS-guided PCI proximal to distal LAD, distal LM, PCI- mid to distal LCX    Findings:     LVEDP: 10 mmHg    6 Fr XB 3.5 Guide     Please see logging for predil and postdil details      Ultimately, 2.5 x 38 mm Xience NAZARIO, 3.5 x 38 mm Xience NAZARIO, and 4.0 x 12 mm Xience NAZARIO overlapping  from distal LAD to distal LM. 2.75 x 18 mm Xience NAZARIO to mid LCX with 2.25 x 18 mm Xience NAZARIO overlapping distally. 0% residual LM, LAD, and mid to distal LCX. Residual 70% ostial ramus and 40% ostial LCX. Severe, diffusely diseased smaller second and third marginals to be managed medically. 3/17/21  Left Heart Cath  Dominance: Right       LM: distal stent patent  LAD: proximal mid and distal stents widely patent  RI: smaller vessel with 70% ostial stenosis  LCx: 40% ostial disease prior to patent proximal and mid stents. Severe, diffusely diseased smaller second and third marginals.   RCA: 70% proximal, 85% mid, 70% distal stenoses; RPLB and RPDA free of significant disease      LVEDP: 16 mmHg      Ultimately, AR1 guide with Guideliner to deliver   3.5 x 12 mm Xience NAZARIO proximal overlapping 3.25 x28 mm Xience NAZARIO mid  3.0 x 15 mm Xience NAZARIO overlapping 2.75 x 15 mm Xience NAZARIO distal      0% residual    1/14/21 TTE   Summary   Overall, left ventricular systolic function is mildly depressed. Ejection fraction is visually estimated to be 40-45%. Mild mitral regurgitation. The right ventricle is normal in size and function. 6/22/21 TTE   Summary   Overall, left ventricular systolic function is mildly depressed. Ejection fraction is visually estimated to be 45-50%. No regional wall motion abnormalities are noted. Diastolic filling parameters suggests grade II diastolic dysfunction . Mild mitral regurgitation. Normal right ventricular size and function. Trivial tricuspid regurgitation. RVSP = 24 mmHg. Impression/Recommendations    Mr. Rossy Deal is a 64 y.o. male patient with:    CAD: POBA-mid LAD 1/2021 in the setting of STEMI, PCI- LAD, distal LM, PCI- LCX 1/2021 (following surgical turndown for CABG in the setting of COVID19 Infection, staged PCI to RCA 3/2021  Ischemic cardiomyopathy, recovered (LVEF 45-50%, mild MR by 6/2021 TTE)  Hypertension, controlled (116/80)  Hyperlipidemia, controlled to LDL target <70 (1/2021:   HDL 41 LDL 69)  CHELSEY  Obesity  Former tobacco use        Anthony Ildefonso is doing well overall, one year out from [address] presentation during [address]. No recurrent angina following complete revascularization. Discussed below details. Consider surveillance SPECT MPI at next annual follow up.      ASA 81 mg long term  Brilinta 90 mg bid --> 60 mg bid at this time and expect longer term second antiplatelete for multivessel stenting/ ischemic risk  Atorvastatin 80 mg   Metoprolol succinate 100 mg   Lisinopril 5 mg       Patient Instructions   Reduce Brilinta (Ticagrelor) from 90 mg to 60 mg twice a day  Take Lisinopril 5 mg in the morning  Switch Metoprolol XL from 50 mg twice a day to 100 mg daily but take at night  Keep well hydrated, especially when outside doing yard work in warm weather etc   Follow up in office in one year        Thank you for allowing me to participate in the care of your patient. Please do not hesitate to call. Alicia Goodwin DO, [facility] [place]  Interventional Cardiology     o: [phone]  [facility] [employer] [address], [address]      NOTE:  This report was transcribed using voice recognition software. Every effort was made to ensure accuracy; however, inadvertent computerized transcription errors may be present. Scribe's attestation: This note was scribed in the presence of Germain Atkinson Diver by Tammy Bright RN    I, Alicia Goodwin, have personally performed the services described in this documentation as scribed by in my presence, and it is both accurate and complete. An electronic signature was used to authenticate this note.

## 2022-03-08 ENCOUNTER — OFFICE VISIT (OUTPATIENT)
Dept: CARDIOLOGY CLINIC | Age: 62
End: 2022-03-08
Payer: COMMERCIAL

## 2022-03-08 VITALS
HEIGHT: 66 IN | WEIGHT: 202.5 LBS | DIASTOLIC BLOOD PRESSURE: 80 MMHG | HEART RATE: 79 BPM | SYSTOLIC BLOOD PRESSURE: 116 MMHG | OXYGEN SATURATION: 98 % | BODY MASS INDEX: 32.54 KG/M2

## 2022-03-08 DIAGNOSIS — I10 ESSENTIAL HYPERTENSION: ICD-10-CM

## 2022-03-08 DIAGNOSIS — E78.5 DYSLIPIDEMIA: ICD-10-CM

## 2022-03-08 DIAGNOSIS — I25.83 CORONARY ARTERY DISEASE DUE TO LIPID RICH PLAQUE: Primary | ICD-10-CM

## 2022-03-08 DIAGNOSIS — I25.10 CORONARY ARTERY DISEASE INVOLVING NATIVE CORONARY ARTERY OF NATIVE HEART WITHOUT ANGINA PECTORIS: ICD-10-CM

## 2022-03-08 DIAGNOSIS — I25.10 CORONARY ARTERY DISEASE DUE TO LIPID RICH PLAQUE: Primary | ICD-10-CM

## 2022-03-08 DIAGNOSIS — I25.5 ISCHEMIC CARDIOMYOPATHY: ICD-10-CM

## 2022-03-08 PROCEDURE — 99214 OFFICE O/P EST MOD 30 MIN: CPT | Performed by: INTERNAL MEDICINE

## 2022-03-08 RX ORDER — METOPROLOL SUCCINATE 100 MG/1
100 TABLET, EXTENDED RELEASE ORAL NIGHTLY
Qty: 90 TABLET | Refills: 3
Start: 2022-03-08

## 2022-03-08 NOTE — PATIENT INSTRUCTIONS
Reduce Brilinta (Ticagrelor) from 90 mg to 60 mg twice a day  Take Lisinopril 5 mg in the morning  Switch Metoprolol XL from 50 mg twice a day to 100 mg daily but take at night  Keep well hydrated, especially when outside doing yard work in warm weather etc   Follow up in office in one year

## 2022-12-22 RX ORDER — ATORVASTATIN CALCIUM 80 MG/1
TABLET, FILM COATED ORAL
Qty: 90 TABLET | Refills: 3 | Status: SHIPPED | OUTPATIENT
Start: 2022-12-22

## 2022-12-22 RX ORDER — LISINOPRIL 5 MG/1
5 TABLET ORAL DAILY
Qty: 90 TABLET | Refills: 3 | Status: SHIPPED | OUTPATIENT
Start: 2022-12-22

## 2022-12-22 NOTE — TELEPHONE ENCOUNTER
Received refill request for lisinopril & lipitor from [facility] : 3/8/22 bnn    Last Labs : Labs past due     Last Refill :  11/30/21 90w3

## 2023-03-14 ENCOUNTER — OFFICE VISIT (OUTPATIENT)
Dept: CARDIOLOGY CLINIC | Age: 63
End: 2023-03-14
Payer: COMMERCIAL

## 2023-03-14 VITALS
HEART RATE: 89 BPM | SYSTOLIC BLOOD PRESSURE: 136 MMHG | HEIGHT: 66 IN | OXYGEN SATURATION: 97 % | BODY MASS INDEX: 33.35 KG/M2 | WEIGHT: 207.5 LBS | DIASTOLIC BLOOD PRESSURE: 84 MMHG

## 2023-03-14 DIAGNOSIS — E78.5 HYPERLIPIDEMIA, UNSPECIFIED HYPERLIPIDEMIA TYPE: ICD-10-CM

## 2023-03-14 DIAGNOSIS — I25.10 CORONARY ARTERY DISEASE DUE TO LIPID RICH PLAQUE: Primary | ICD-10-CM

## 2023-03-14 DIAGNOSIS — I25.83 CORONARY ARTERY DISEASE DUE TO LIPID RICH PLAQUE: Primary | ICD-10-CM

## 2023-03-14 DIAGNOSIS — Z87.891 HX OF TOBACCO USE, PRESENTING HAZARDS TO HEALTH: ICD-10-CM

## 2023-03-14 DIAGNOSIS — I25.5 ISCHEMIC CARDIOMYOPATHY: ICD-10-CM

## 2023-03-14 DIAGNOSIS — I25.10 CORONARY ARTERY DISEASE INVOLVING NATIVE CORONARY ARTERY OF NATIVE HEART WITHOUT ANGINA PECTORIS: ICD-10-CM

## 2023-03-14 PROCEDURE — 3075F SYST BP GE 130 - 139MM HG: CPT | Performed by: INTERNAL MEDICINE

## 2023-03-14 PROCEDURE — 99214 OFFICE O/P EST MOD 30 MIN: CPT | Performed by: INTERNAL MEDICINE

## 2023-03-14 PROCEDURE — 93000 ELECTROCARDIOGRAM COMPLETE: CPT | Performed by: INTERNAL MEDICINE

## 2023-03-14 PROCEDURE — 3079F DIAST BP 80-89 MM HG: CPT | Performed by: INTERNAL MEDICINE

## 2023-03-14 RX ORDER — METOPROLOL SUCCINATE 100 MG/1
100 TABLET, EXTENDED RELEASE ORAL NIGHTLY
Qty: 90 TABLET | Refills: 3 | Status: SHIPPED | OUTPATIENT
Start: 2023-03-14

## 2023-03-14 RX ORDER — CLOPIDOGREL BISULFATE 75 MG/1
75 TABLET ORAL DAILY
Qty: 90 TABLET | Refills: 3 | Status: SHIPPED | OUTPATIENT
Start: 2023-03-14

## 2023-03-14 NOTE — PATIENT INSTRUCTIONS
Aspirin and Brilinta can be stopped and replaced with Plavix 75 mg once daily  (Do not stop until you get the new medication)    Echocardiogram and vascular screening (carotid arteries, leg arteries, screen for abdominal aneurysm)    Recheck your cholesterol panel at your convenience (fasting bloodwork)

## 2023-03-14 NOTE — PROGRESS NOTES
3/14/2023    PATIENT: Linda Jade  : 1960    Primary Care Provider:   Juan M Hernández MD  21     Reason for evaluation:   Chief Complaint   Patient presents with    Coronary Artery Disease     No cardiac symptoms at this time    1 Year Follow Up       History of present illness:   Mr. Linda Jade is a 58 y.o. male patient here in annual cardiovascular follow up regarding CAD, ischemic cardiomyopathy, hyperlipidemia, and hypertension. Rafi first became known to me after presenting to the ER in the setting of a STEMI 21 with complaints of well identified \"crushing\" chest pain and diaphoresis. He ultimately underwent complex multivessel stenting after surgical turndown in the setting of COVID-19 admission. Rafi describes a return to an active lifestyle without concerns. 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 He has golfed almost 20 times this year already. He is getting ready to go to the [employer] in 3 weeks. He states several times he feels good with all of this, to include 2 more grandchildren and frequent travel. He reports some days with fatigue. He admits he has not worn his CPAP in 6 years and has not interested in updating a sleep study at this time. He quit smoking 12 years ago. Compliant with medications without adverse effects.     Medical History:      Diagnosis Date    CAD (coronary artery disease)     Hypertension     STEMI (ST elevation myocardial infarction) ([facility] [address] 2021       Surgical History:      Procedure Laterality Date    CORONARY ANGIOPLASTY WITH STENT PLACEMENT      ELBOW SURGERY      rt elbow       Social History:  Social History     Socioeconomic History    Marital status:      Spouse name: Not on file    Number of children: Not on file    Years of education: Not on file    Highest education level: Not on file   Occupational History    Not on file   Tobacco Use Smoking status: Former     Packs/day: 1.00     Years: 25.00     Pack years: 25.00     Types: Cigarettes     Quit date: 2010     Years since quittin.2    Smokeless tobacco: Never   Vaping Use    Vaping Use: Never used   Substance and Sexual Activity    Alcohol use: Yes     Alcohol/week: 2.0 - 4.0 standard drinks     Types: 2 - 4 Cans of beer per week     Comment: occasional    Drug use: Never    Sexual activity: Not on file   Other Topics Concern    Not on file   Social History Narrative    Not on file     Social Determinants of Health     Financial Resource Strain: Not on file   Food Insecurity: Not on file   Transportation Needs: Not on file   Physical Activity: Not on file   Stress: Not on file   Social Connections: Not on file   Intimate Partner Violence: Not on file   Housing Stability: Not on file        Family History:  No evidence for sudden cardiac death or premature CAD. Problem Relation Age of Onset    Cancer Mother     Kidney Disease Father        Medications:  [x] Medications and dosages reviewed. Prior to Admission medications    Medication Sig Start Date End Date Taking? Authorizing Provider   metoprolol succinate (TOPROL XL) 100 MG extended release tablet Take 1 tablet by mouth at bedtime 3/14/23  Yes Renelle Stage, DO   clopidogrel (PLAVIX) 75 MG tablet Take 1 tablet by mouth daily 3/14/23  Yes Renelle Stage, DO   lisinopril (PRINIVIL;ZESTRIL) 5 MG tablet Take 1 tablet by mouth daily 22  Yes Renelle Stage, DO   atorvastatin (LIPITOR) 80 MG tablet TAKE 1 TABLET NIGHTLY 22  Yes Renelle Stage, DO   carbidopa-levodopa (SINEMET)  MG per tablet Take 1 tablet by mouth every evening   Yes Historical Provider, MD   lansoprazole (PREVACID) 15 MG delayed release capsule Take 15 mg by mouth daily   Yes Historical Provider, MD       Allergies:  Patient has no known allergies.      Review of Systems:    [x]Full ROS obtained and negative except as mentioned in HPI    Physical Examination:    /84 (Site: Right Upper Arm, Position: Sitting, Cuff Size: Medium Adult)   Pulse 89   Ht 5' 6\" (1.676 m)   Wt 207 lb 8 oz (94.1 kg)   SpO2 97%   BMI 33.49 kg/m²   Wt Readings from Last 3 Encounters:   03/14/23 207 lb 8 oz (94.1 kg)   03/08/22 202 lb 8 oz (91.9 kg)   11/30/21 200 lb 14.4 oz (91.1 kg)     Vitals:    03/14/23 1421   BP: 136/84   Pulse: 89   SpO2: 97%       GENERAL: Well developed, well nourished, no acute distress  NEUROLOGICAL: Alert and oriented x3  PSYCH: Normal mood and affect   SKIN: Warm and dry  HEENT: Normocephalic, atraumatic, Sclera non-icteric, mucous membranes moist  NECK: supple, JVP normal  CARDIAC: Normal PMI, regular rate and rhythm, normal S1S2, no murmur, rub, or gallop  RESPIRATORY: Normal respiratory effort, clear to auscultation bilaterally  EXTREMITIES: no edema or clubbing, +2 pulses bilaterally   MUSCULOSKELETAL: No joint swelling or tenderness, no chest wall tenderness  GASTROINTESTINAL: normal bowel sounds, soft, non-tender    Imaging:  I have reviewed the below testing personally:    1/13/21  Left Heart Cath  Dominance: Right       LM: 40% eccentric distal stenosis   LAD: 90% ostial to proximal stenosis; 90% mid in stent restenosis , jailed second diagonal with 30% ostial narrowing ; 100% mid stent thrombosis at distal edge   LCx: diffuse disease with 90% mid and distal stenoses, with severe disease extending into proximal OM2   RCA: 70% proximal and 85% mid stenoses; RPLB and RPDA free of significant disease      LVEDP: 17 mmHg   LVEF: 25-30%      6 Fr XB 3.5 Guide   BMW wire to apical LAD with ease  2.5 x 12 mm Trek balloon to culprit occlusion   Restoration of PAULETTE III flow to wrap around apex     1/20/21  Cardiac Catheterization  Procedure: LHC, IVUS-guided PCI proximal to distal LAD, distal LM, PCI- mid to distal LCX    Findings:     LVEDP: 10 mmHg    6 Fr XB 3.5 Guide     Please see logging for predil and postdil details Ultimately, 2.5 x 38 mm Xience NAZARIO, 3.5 x 38 mm Xience NAZARIO, and 4.0 x 12 mm Xience NAZARIO overlapping  from distal LAD to distal LM. 2.75 x 18 mm Xience NAZARIO to mid LCX with 2.25 x 18 mm Xience NAZARIO overlapping distally. 0% residual LM, LAD, and mid to distal LCX. Residual 70% ostial ramus and 40% ostial LCX. Severe, diffusely diseased smaller second and third marginals to be managed medically. 3/17/21  Left Heart Cath  Dominance: Right       LM: distal stent patent  LAD: proximal mid and distal stents widely patent  RI: smaller vessel with 70% ostial stenosis  LCx: 40% ostial disease prior to patent proximal and mid stents. Severe, diffusely diseased smaller second and third marginals. RCA: 70% proximal, 85% mid, 70% distal stenoses; RPLB and RPDA free of significant disease      LVEDP: 16 mmHg      Ultimately, AR1 guide with Guideliner to deliver   3.5 x 12 mm Xience NAZARIO proximal overlapping 3.25 x28 mm Xience NAZARIO mid  3.0 x 15 mm Xience NAZARIO overlapping 2.75 x 15 mm Xience NAZARIO distal      0% residual    1/14/21 TTE   Summary   Overall, left ventricular systolic function is mildly depressed. Ejection fraction is visually estimated to be 40-45%. Mild mitral regurgitation. The right ventricle is normal in size and function. 6/22/21 TTE   Summary   Overall, left ventricular systolic function is mildly depressed. Ejection fraction is visually estimated to be 45-50%. No regional wall motion abnormalities are noted. Diastolic filling parameters suggests grade II diastolic dysfunction . Mild mitral regurgitation. Normal right ventricular size and function. Trivial tricuspid regurgitation. RVSP = 24 mmHg.     EKG 3/14/23  SR     Impression/Recommendations    Mr. Jesus Fox is a 58 y.o. male patient with:    CAD: POBA-mid LAD 1/2021 in the setting of STEMI, PCI- LAD, distal LM, PCI- LCX 1/2021 (following surgical turndown for CABG in the setting of COVID19 admission; staged PCI to RCA 3/2021  Ischemic cardiomyopathy, recovered (LVEF 45-50%, mild MR by 6/2021 TTE)  Hypertension, controlled   Hyperlipidemia, due for recheck (1/2021:   HDL 41 LDL 69)  CHELSEY  Obesity  Former tobacco use      Vangie Angel is now two years out from last PCI. Discussed balancing antiplatelet strategy in regards to ischemic and bleeding risks; opting for single P2Y12I (Plavix monotherapy). He is leading a very active lifestyle without angina. Continue:   Atorvastatin 80 mg   Metoprolol succinate 100 mg   Lisinopril 5 mg     He is agreeable to vascular screening when here for surveillance echocardiogram this year. Patient Instructions   Aspirin and Brilinta can be stopped and replaced with Plavix 75 mg once daily  (Do not stop until you get the new medication)    Echocardiogram and vascular screening (carotid arteries, leg arteries, screen for abdominal aneurysm)    Recheck your cholesterol panel at your convenience (fasting bloodwork)     Thank you for allowing me to participate in the care of your patient. Please do not hesitate to call. Anita Ascencio DO, [facility] [place]  Interventional Cardiology     o: [phone]  [address] [place] [address]., [address], [address] [facility]      NOTE:  This report was transcribed using voice recognition software. Every effort was made to ensure accuracy; however, inadvertent computerized transcription errors may be present. Scribe's Attestation: This note was scribed in the presence of Dr. Yoshi Crespo DO by Jude Gil, AARON.    I, Anita Ascencio, have personally performed the services described in this documentation as scribed by Scarlett Banda RN in my presence, and it is both accurate and complete. An electronic signature was used to authenticate this note.

## 2023-03-20 ENCOUNTER — TELEPHONE (OUTPATIENT)
Dept: CARDIOLOGY CLINIC | Age: 63
End: 2023-03-20

## 2023-03-20 NOTE — TELEPHONE ENCOUNTER
Brilinta is discontinued and Tari Mcnair has been started on Plavix 75 mg once daily by last OV with BNN. Called [facility]- clarified orders.

## 2023-03-20 NOTE — TELEPHONE ENCOUNTER
Pt called and ask for [facility] to call back. He has questions about the Brilinta to Plavix med. Also [facility] questions. Please advise.

## 2023-03-20 NOTE — TELEPHONE ENCOUNTER
[facility] caremarks called left message on [facility] voicemail, stating trying get help for prescription for the clopidogrel (PLAVIX) 75 MG regarding the fact that they have both  Brilinta and clopidogrel (PLAVIX) 75 MG, and they are both considered the therapeutic equivalent.           [phone] option 2    PRX#[id number]

## 2023-03-20 NOTE — TELEPHONE ENCOUNTER
Called Rafi= [facility] already sent him more Brilinta  I told him if he cannot send back, then he can finish that bottle then switch to Plavix after  He verbalized understanding.

## 2024-01-17 RX ORDER — ATORVASTATIN CALCIUM 80 MG/1
80 TABLET, FILM COATED ORAL NIGHTLY
Qty: 90 TABLET | Refills: 3 | Status: SHIPPED | OUTPATIENT
Start: 2024-01-17

## 2024-01-17 NOTE — TELEPHONE ENCOUNTER
Medication Refill    Medication needing refilled:  atorvastatin (LIPITOR) 80 MG     lisinopril (PRINIVIL;ZESTRIL) 5 MG   Dosage of the medication:    How are you taking this medication (QD, BID, TID, QID, PRN):    30 or 90 day supply called in: 90 day supply    When will you run out of your medication:    Which Pharmacy are we sending the medication to?: [facility] - [place] - [address] - P [phone] - F [phone]

## 2024-01-22 RX ORDER — LISINOPRIL 5 MG/1
5 TABLET ORAL DAILY
Qty: 90 TABLET | Refills: 3 | Status: SHIPPED | OUTPATIENT
Start: 2024-01-22

## 2024-01-22 NOTE — TELEPHONE ENCOUNTER
Last OV: 23 BNN  Next OV: 24 BNN  Last Labs: 21  Last EK23  Last Fill:   lisinopril (PRINIVIL;ZESTRIL) 5 MG tablet 90 tablet 3 2022 --    Sig - Route: Take 1 tablet by mouth daily - Oral    Sent to pharmacy as: Lisinopril 5 MG Oral Tablet (PRINIVIL;ZESTRIL)    E-Prescribing Status: Receipt confirmed by pharmacy (2022  1:58 PM EST)

## 2024-03-06 NOTE — PATIENT INSTRUCTIONS
Try to reduce your sodium intake (including frozen meals, lunch meat, soups/canned foods, using the salt shaker)  Try an limit to under 3000 mg daily    Keep a home blood pressure and heart rate log and call Joe in a few weeks with home logging (try to get readings around the same time)    We will discuss updating your stress test at the next visit     Carotid Ultrasound

## 2024-03-06 NOTE — PROGRESS NOTES
allowing me to participate in the care of your patient. Please do not hesitate to call.     Randi Elizalde DO, Located within [facility]  Interventional Cardiology     o: [phone]  [address] [place] [address], [address]  [place] [address]        NOTE:  This report was transcribed using voice recognition software.  Every effort was made to ensure accuracy; however, inadvertent computerized transcription errors may be present.    Scribe's Attestation: This note was scribed in the presence of Dr. Tonio DO by Joe Banda RN.    I, Randi Elizalde, have personally performed the services described in this documentation as scribed by Joe Banda RN in my presence, and it is both accurate and complete. An electronic signature was used to authenticate this note.

## 2024-03-13 ENCOUNTER — OFFICE VISIT (OUTPATIENT)
Dept: CARDIOLOGY CLINIC | Age: 64
End: 2024-03-13
Payer: COMMERCIAL

## 2024-03-13 VITALS
OXYGEN SATURATION: 97 % | BODY MASS INDEX: 34.07 KG/M2 | WEIGHT: 212 LBS | SYSTOLIC BLOOD PRESSURE: 168 MMHG | HEIGHT: 66 IN | DIASTOLIC BLOOD PRESSURE: 98 MMHG | HEART RATE: 69 BPM

## 2024-03-13 DIAGNOSIS — I10 ESSENTIAL HYPERTENSION: ICD-10-CM

## 2024-03-13 DIAGNOSIS — I25.5 ISCHEMIC CARDIOMYOPATHY: ICD-10-CM

## 2024-03-13 DIAGNOSIS — E78.5 DYSLIPIDEMIA: ICD-10-CM

## 2024-03-13 DIAGNOSIS — I25.10 CORONARY ARTERY DISEASE INVOLVING NATIVE CORONARY ARTERY OF NATIVE HEART WITHOUT ANGINA PECTORIS: Primary | ICD-10-CM

## 2024-03-13 DIAGNOSIS — Z87.891 EX-SMOKER: ICD-10-CM

## 2024-03-13 DIAGNOSIS — I65.23 BILATERAL CAROTID ARTERY STENOSIS: ICD-10-CM

## 2024-03-13 PROCEDURE — 3077F SYST BP >= 140 MM HG: CPT | Performed by: INTERNAL MEDICINE

## 2024-03-13 PROCEDURE — 99214 OFFICE O/P EST MOD 30 MIN: CPT | Performed by: INTERNAL MEDICINE

## 2024-03-13 PROCEDURE — 93000 ELECTROCARDIOGRAM COMPLETE: CPT | Performed by: INTERNAL MEDICINE

## 2024-03-13 PROCEDURE — 3078F DIAST BP <80 MM HG: CPT | Performed by: INTERNAL MEDICINE

## 2024-03-15 ENCOUNTER — TELEPHONE (OUTPATIENT)
Dept: CARDIOLOGY CLINIC | Age: 64
End: 2024-03-15

## 2024-03-15 DIAGNOSIS — I25.10 CORONARY ARTERY DISEASE INVOLVING NATIVE CORONARY ARTERY OF NATIVE HEART WITHOUT ANGINA PECTORIS: ICD-10-CM

## 2024-03-15 DIAGNOSIS — I25.5 ISCHEMIC CARDIOMYOPATHY: ICD-10-CM

## 2024-03-15 NOTE — TELEPHONE ENCOUNTER
Medication Refill    Medication needing refilled:  metoprolol succinate (TOPROL XL)   clopidogrel (PLAVIX)     Dosage of the medication:  100 MG extended release tablet   75 MG tablet     How are you taking this medication (QD, BID, TID, QID, PRN):  Take 1 tablet by mouth at bedtime - PT takes at morning  Take 1 tablet by mouth daily     30 or 90 day supply called in:  90 day    When will you run out of your medication:    Which Pharmacy are we sending the medication to?:  [facility] - [place] - [address] - P [phone] - F [phone]

## 2024-03-18 ENCOUNTER — HOSPITAL ENCOUNTER (OUTPATIENT)
Dept: VASCULAR LAB | Age: 64
Discharge: HOME | End: 2024-03-18
Attending: INTERNAL MEDICINE
Payer: COMMERCIAL

## 2024-03-18 DIAGNOSIS — E78.5 DYSLIPIDEMIA: ICD-10-CM

## 2024-03-18 DIAGNOSIS — I65.23 BILATERAL CAROTID ARTERY STENOSIS: ICD-10-CM

## 2024-03-18 PROCEDURE — 93880 EXTRACRANIAL BILAT STUDY: CPT

## 2024-03-29 ENCOUNTER — TELEPHONE (OUTPATIENT)
Dept: CARDIOLOGY CLINIC | Age: 64
End: 2024-03-29

## 2024-03-29 NOTE — TELEPHONE ENCOUNTER
Called patient left message to return call back and give us his blood pressure and heart rate readings.

## 2024-04-04 RX ORDER — LISINOPRIL 30 MG/1
30 TABLET ORAL DAILY
Qty: 90 TABLET | Refills: 1 | Status: SHIPPED | OUTPATIENT
Start: 2024-04-04

## 2024-04-04 NOTE — TELEPHONE ENCOUNTER
Called spoke with patient state he has been going to the [employer] getting his b/p checked. States has not been keeping note of his heart rate.    3/21/24   138/96  3/25/24  142/98  3/31/24   148/102  4/3/24     138/90    States only days he has.    States been taking:  Metoprolol 100 mg daily  Lisinopril  20 mg daily    States has been watching his salt intake.  Being more active.    Please advise.

## 2024-04-04 NOTE — TELEPHONE ENCOUNTER
Dr. Elizalde reviewed  Great job on salt restriction and increasing his activity!  Should still increase Lisinopril to 30 mg once daily for better control  Please let him know

## 2024-04-05 NOTE — TELEPHONE ENCOUNTER
Called spoke with patient gave message. Will start taking lisinopril 30 mg daily. States will continue monitoring his b/p reading and better bg style.

## 2024-10-08 RX ORDER — LISINOPRIL 30 MG/1
30 TABLET ORAL DAILY
Qty: 90 TABLET | Refills: 3 | Status: SHIPPED | OUTPATIENT
Start: 2024-10-08

## 2024-10-08 NOTE — TELEPHONE ENCOUNTER
Medication Refill    Medication needing refilled: lisinopril (PRINIVIL;ZESTRIL)      Dosage of the medication:30 MG tablet     How are you taking this medication (QD, BID, TID, QID, PRN):  : Take 1 tablet by mouth daily     30 or 90 day supply called in:  90 days with 3 refills     When will you run out of your medication:  has 6 days left    Which Pharmacy are we sending the medication to?:  [facility] [place] [facility] - P [phone] - F [phone]  [facility] [place] [address]  Phone: [phone]  Fax: [phone]     Last office visit 3/15/2024  Next office visit 3/28/2025

## 2025-01-09 ENCOUNTER — TELEPHONE (OUTPATIENT)
Dept: CARDIOLOGY CLINIC | Age: 65
End: 2025-01-09

## 2025-01-09 DIAGNOSIS — I25.10 CORONARY ARTERY DISEASE INVOLVING NATIVE CORONARY ARTERY OF NATIVE HEART WITHOUT ANGINA PECTORIS: Primary | ICD-10-CM

## 2025-01-09 DIAGNOSIS — I10 ESSENTIAL HYPERTENSION: ICD-10-CM

## 2025-01-09 NOTE — TELEPHONE ENCOUNTER
Medication Refill    When was your last appointment with cardiology?    (If 1 yr or longer, please schedule appointment)    (If patient has been told they do not need to follow-up - medications should be filled by PCP)    When did you last have labs drawn?     Medication needing refilled?  atorvastatin (LIPITOR)     Dosage of the medication?  80 MG tablet     How are you taking this medication (QD, BID, TID, QID, PRN)?  Take 1 tablet by mouth nightly     Do you want a 30 or 90 day supply?  90 day supply    When will you run out of your medication?     Which Pharmacy are we sending this medication to?  [facility] - [place] - [facility]   Pharmacy Phone:[phone]   Pharmacy Fax:[phone]

## 2025-01-09 NOTE — TELEPHONE ENCOUNTER
Last OV: 03/13/2024  Next OV: 04/11/2025  Most recent Labs: 01/25/2024 cmp 03/14/2023 lipid       Pt needs updated labs. Called pt regarding labs and he would like to know if he just needs a lipid or if he will need anything else before his appt. Please advise. Lipid has been placed.

## 2025-01-10 RX ORDER — ATORVASTATIN CALCIUM 80 MG/1
80 TABLET, FILM COATED ORAL NIGHTLY
Qty: 90 TABLET | Refills: 3 | Status: SHIPPED | OUTPATIENT
Start: 2025-01-10

## 2025-01-13 ENCOUNTER — HOSPITAL ENCOUNTER (OUTPATIENT)
Age: 65
Discharge: HOME | End: 2025-01-13
Payer: COMMERCIAL

## 2025-01-13 DIAGNOSIS — I25.10 CORONARY ARTERY DISEASE INVOLVING NATIVE CORONARY ARTERY OF NATIVE HEART WITHOUT ANGINA PECTORIS: ICD-10-CM

## 2025-01-13 DIAGNOSIS — I10 ESSENTIAL HYPERTENSION: ICD-10-CM

## 2025-01-13 LAB
ALBUMIN SERPL-MCNC: 4.1 G/DL (ref 3.4–5)
ALBUMIN/GLOB SERPL: 1.3 {RATIO} (ref 1.1–2.2)
ALP SERPL-CCNC: 92 U/L (ref 40–129)
ALT SERPL-CCNC: 60 U/L (ref 10–40)
ANION GAP SERPL CALCULATED.3IONS-SCNC: 12 MMOL/L (ref 3–16)
AST SERPL-CCNC: 43 U/L (ref 15–37)
BASOPHILS # BLD: 0 K/UL (ref 0–0.2)
BASOPHILS NFR BLD: 0.7 %
BILIRUB SERPL-MCNC: 0.5 MG/DL (ref 0–1)
BUN SERPL-MCNC: 15 MG/DL (ref 7–20)
CALCIUM SERPL-MCNC: 9.2 MG/DL (ref 8.3–10.6)
CHLORIDE SERPL-SCNC: 103 MMOL/L (ref 99–110)
CHOLEST SERPL-MCNC: 143 MG/DL (ref 0–199)
CO2 SERPL-SCNC: 22 MMOL/L (ref 21–32)
CREAT SERPL-MCNC: 0.8 MG/DL (ref 0.8–1.3)
DEPRECATED RDW RBC AUTO: 13.6 % (ref 12.4–15.4)
EOSINOPHIL # BLD: 0.1 K/UL (ref 0–0.6)
EOSINOPHIL NFR BLD: 1.6 %
GFR SERPLBLD CREATININE-BSD FMLA CKD-EPI: >90 ML/MIN/{1.73_M2}
GLUCOSE SERPL-MCNC: 107 MG/DL (ref 70–99)
HCT VFR BLD AUTO: 45.5 % (ref 40.5–52.5)
HDLC SERPL-MCNC: 37 MG/DL (ref 40–60)
HGB BLD-MCNC: 15.7 G/DL (ref 13.5–17.5)
LDLC SERPL CALC-MCNC: 87 MG/DL
LYMPHOCYTES # BLD: 1.4 K/UL (ref 1–5.1)
LYMPHOCYTES NFR BLD: 22.7 %
MCH RBC QN AUTO: 31.1 PG (ref 26–34)
MCHC RBC AUTO-ENTMCNC: 34.4 G/DL (ref 31–36)
MCV RBC AUTO: 90.3 FL (ref 80–100)
MONOCYTES # BLD: 0.5 K/UL (ref 0–1.3)
MONOCYTES NFR BLD: 7.3 %
NEUTROPHILS # BLD: 4.3 K/UL (ref 1.7–7.7)
NEUTROPHILS NFR BLD: 67.7 %
PLATELET # BLD AUTO: 165 K/UL (ref 135–450)
PMV BLD AUTO: 9.4 FL (ref 5–10.5)
POTASSIUM SERPL-SCNC: 4.2 MMOL/L (ref 3.5–5.1)
PROT SERPL-MCNC: 7.3 G/DL (ref 6.4–8.2)
RBC # BLD AUTO: 5.04 M/UL (ref 4.2–5.9)
SODIUM SERPL-SCNC: 137 MMOL/L (ref 136–145)
TRIGL SERPL-MCNC: 95 MG/DL (ref 0–150)
VLDLC SERPL CALC-MCNC: 19 MG/DL
WBC # BLD AUTO: 6.3 K/UL (ref 4–11)

## 2025-01-13 PROCEDURE — 80053 COMPREHEN METABOLIC PANEL: CPT

## 2025-01-13 PROCEDURE — 80061 LIPID PANEL: CPT

## 2025-01-13 PROCEDURE — 85025 COMPLETE CBC W/AUTO DIFF WBC: CPT

## 2025-01-13 PROCEDURE — 36415 COLL VENOUS BLD VENIPUNCTURE: CPT

## 2025-01-14 ENCOUNTER — TELEPHONE (OUTPATIENT)
Dept: CARDIOLOGY CLINIC | Age: 65
End: 2025-01-14

## 2025-01-14 NOTE — TELEPHONE ENCOUNTER
Rafi is in agreement to seeing cost of PCSK9i  Sent script   He does admit he likes \"chocolate and snacks\"

## 2025-01-14 NOTE — TELEPHONE ENCOUNTER
----- Message from Dr. Randi Elizalde, DO sent at 1/13/2025  3:59 PM EST -----  If he is taking Atorvastatin 80 mg consistently, would consider applying for PCSK9i.   LFTs are acceptable and improved from prior but LDL is not to target/

## 2025-03-31 DIAGNOSIS — I25.5 ISCHEMIC CARDIOMYOPATHY: ICD-10-CM

## 2025-03-31 DIAGNOSIS — I25.10 CORONARY ARTERY DISEASE INVOLVING NATIVE CORONARY ARTERY OF NATIVE HEART WITHOUT ANGINA PECTORIS: ICD-10-CM

## 2025-03-31 NOTE — TELEPHONE ENCOUNTER
Patient would like a call once they are sent over.   Callback: [phone]   Medication Refill    When was your last appointment with cardiology?    (If 1 yr or longer, please schedule appointment)    (If patient has been told they do not need to follow-up - medications should be filled by PCP)  3/13/24    When did you last have labs drawn?   1/13/25    Medication needing refilled?  metoprolol succinate (TOPROL XL)    clopidogrel (PLAVIX)     Dosage of the medication?  100 MG extended release tablet   75 MG tablet     How are you taking this medication (QD, BID, TID, QID, PRN)?  Take 1 tablet by mouth at bedtime   Take 1 tablet by mouth daily     Do you want a 30 or 90 day supply?  90 and temporary 30   90    When will you run out of your medication?   4/2/25    Which Pharmacy are we sending this medication to?  [facility] - [place] - [facility]   Temporary supply  metoprolol succinate (TOPROL XL)  [place] [address] [place] [address]  Pharmacy Phone:[phone]   Pharmacy Fax:[phone]

## 2025-04-01 RX ORDER — CLOPIDOGREL BISULFATE 75 MG/1
75 TABLET ORAL DAILY
Qty: 30 TABLET | Refills: 0 | Status: SHIPPED | OUTPATIENT
Start: 2025-04-01 | End: 2025-04-11 | Stop reason: SDUPTHER

## 2025-04-01 RX ORDER — METOPROLOL SUCCINATE 100 MG/1
100 TABLET, EXTENDED RELEASE ORAL DAILY
Qty: 30 TABLET | Refills: 0 | Status: SHIPPED | OUTPATIENT
Start: 2025-04-01 | End: 2025-04-11 | Stop reason: SDUPTHER

## 2025-04-01 RX ORDER — METOPROLOL SUCCINATE 100 MG/1
100 TABLET, EXTENDED RELEASE ORAL NIGHTLY
Qty: 90 TABLET | Refills: 0 | Status: CANCELLED | OUTPATIENT
Start: 2025-04-01

## 2025-04-01 NOTE — TELEPHONE ENCOUNTER
I spoke to Rafi. 30 days supplies of Plavix and Toprol sent to local pharmacy. I will send in 90 day supply to mail order at [facility] next week 4/11/25.

## 2025-04-10 NOTE — PATIENT INSTRUCTIONS
Change Lisinopril to 20mg twice a day.     Check blood pressure occasionally over the next month and let us know if consistently running over 130/80.    Fasting lab work in July 2025.    Stay active.    Call the office for any new, worsening, or concerning symptoms.

## 2025-04-10 NOTE — PROGRESS NOTES
4/11/2026     What stress agent should be used?:   Exercise     Can the patient walk on the treadmill?:   Yes     [employer]:   Per Facility Protocol         Patient Instructions   Change Lisinopril to 20mg twice a day.     Check blood pressure occasionally over the next month and let us know if consistently running over 130/80.    Fasting lab work in July 2025.    Stay active.    Call the office for any new, worsening, or concerning symptoms.       Thank you for allowing me to participate in the care of your patient. Please do not hesitate to call.     Randi Elizalde DO, [facility]  Interventional Cardiology     o: [phone]  [address] [facility] [address], [address]  [place] [address]        NOTE:  This report was transcribed using voice recognition software.  Every effort was made to ensure accuracy; however, inadvertent computerized transcription errors may be present.    Scribe's Attestation: This note was scribed in the presence of Dr. Randi Elizalde DO, by Lesvia Ho RN.    I, Randi Elizalde, have personally performed the services described in this documentation as scribed in my presence, and it is both accurate and complete. An electronic signature was used to authenticate this note.

## 2025-04-11 ENCOUNTER — OFFICE VISIT (OUTPATIENT)
Dept: CARDIOLOGY CLINIC | Age: 65
End: 2025-04-11
Payer: COMMERCIAL

## 2025-04-11 VITALS
OXYGEN SATURATION: 70 % | WEIGHT: 213 LBS | HEIGHT: 66 IN | SYSTOLIC BLOOD PRESSURE: 154 MMHG | BODY MASS INDEX: 34.23 KG/M2 | HEART RATE: 97 BPM | DIASTOLIC BLOOD PRESSURE: 98 MMHG

## 2025-04-11 DIAGNOSIS — E78.5 DYSLIPIDEMIA: ICD-10-CM

## 2025-04-11 DIAGNOSIS — I25.10 CORONARY ARTERY DISEASE INVOLVING NATIVE CORONARY ARTERY OF NATIVE HEART WITHOUT ANGINA PECTORIS: Primary | ICD-10-CM

## 2025-04-11 DIAGNOSIS — I65.23 BILATERAL CAROTID ARTERY STENOSIS: ICD-10-CM

## 2025-04-11 DIAGNOSIS — I10 ESSENTIAL HYPERTENSION: ICD-10-CM

## 2025-04-11 DIAGNOSIS — I25.5 ISCHEMIC CARDIOMYOPATHY: ICD-10-CM

## 2025-04-11 PROCEDURE — 3077F SYST BP >= 140 MM HG: CPT | Performed by: INTERNAL MEDICINE

## 2025-04-11 PROCEDURE — 3080F DIAST BP >= 90 MM HG: CPT | Performed by: INTERNAL MEDICINE

## 2025-04-11 PROCEDURE — 93000 ELECTROCARDIOGRAM COMPLETE: CPT | Performed by: INTERNAL MEDICINE

## 2025-04-11 PROCEDURE — G2211 COMPLEX E/M VISIT ADD ON: HCPCS | Performed by: INTERNAL MEDICINE

## 2025-04-11 PROCEDURE — 99214 OFFICE O/P EST MOD 30 MIN: CPT | Performed by: INTERNAL MEDICINE

## 2025-04-11 RX ORDER — METOPROLOL SUCCINATE 100 MG/1
100 TABLET, EXTENDED RELEASE ORAL DAILY
Qty: 90 TABLET | Refills: 3 | Status: SHIPPED | OUTPATIENT
Start: 2025-04-11

## 2025-04-11 RX ORDER — CLOPIDOGREL BISULFATE 75 MG/1
75 TABLET ORAL DAILY
Qty: 90 TABLET | Refills: 3 | Status: SHIPPED | OUTPATIENT
Start: 2025-04-11

## 2025-04-11 RX ORDER — ROPINIROLE 1 MG/1
1 TABLET, FILM COATED ORAL NIGHTLY
COMMUNITY
Start: 2024-12-13

## 2025-04-11 RX ORDER — LISINOPRIL 20 MG/1
20 TABLET ORAL 2 TIMES DAILY
Qty: 180 TABLET | Refills: 3 | Status: SHIPPED | OUTPATIENT
Start: 2025-04-11

## 2025-07-01 NOTE — TELEPHONE ENCOUNTER
Medication Refill    When was your last appointment with cardiology?    (If 1 yr or longer, please schedule appointment)    (If patient has been told they do not need to follow-up - medications should be filled by PCP)  4/11/25  When did you last have labs drawn?   4/11/25  Medication needing refilled?  Evolocumab   Dosage of the medication?  140 MG/ML SOAJ   How are you taking this medication (QD, BID, TID, QID, PRN)?  Inject 140 mg into the skin every 14 days (every other Friday)  Do you want a 30 or 90 day supply?    When will you run out of your medication?   Not due until next Friday   Which Pharmacy are we sending this medication to?  [facility] (Pending [facility] to [facility]) #[id number] - [place], [place] - [address] [place] [address]   Pharmacy Phone:[phone]  Pharmacy Fax:[phone]

## 2025-07-01 NOTE — TELEPHONE ENCOUNTER
Requested Prescriptions     Pending Prescriptions Disp Refills    Evolocumab (REPATHA) SOAJ pen 2 Adjustable Dose Pre-filled Pen Syringe 6     Sig: Inject 1 mL into the skin every 14 days        Last OV: 4/11/2025  Next OV: x  Last refill:1/14/2025  Most recent Labs: 1/13/2025

## 2025-07-11 DIAGNOSIS — E78.5 DYSLIPIDEMIA: ICD-10-CM

## 2025-07-11 LAB
ALT SERPL-CCNC: 37 U/L (ref 10–40)
AST SERPL-CCNC: 27 U/L (ref 15–37)
CHOLEST SERPL-MCNC: 78 MG/DL (ref 0–199)
HDLC SERPL-MCNC: 47 MG/DL (ref 40–60)
LDLC SERPL CALC-MCNC: 15 MG/DL
TRIGL SERPL-MCNC: 82 MG/DL (ref 0–150)
VLDLC SERPL CALC-MCNC: 16 MG/DL